# Patient Record
Sex: FEMALE | Race: WHITE
[De-identification: names, ages, dates, MRNs, and addresses within clinical notes are randomized per-mention and may not be internally consistent; named-entity substitution may affect disease eponyms.]

---

## 2020-10-21 ENCOUNTER — HOSPITAL ENCOUNTER (EMERGENCY)
Age: 60
Discharge: HOME | End: 2020-10-21
Payer: MEDICARE

## 2020-10-21 VITALS
SYSTOLIC BLOOD PRESSURE: 184 MMHG | DIASTOLIC BLOOD PRESSURE: 84 MMHG | RESPIRATION RATE: 18 BRPM | TEMPERATURE: 98.06 F | OXYGEN SATURATION: 99 % | HEART RATE: 74 BPM

## 2020-10-21 VITALS — BODY MASS INDEX: 23.8 KG/M2

## 2020-10-21 VITALS
TEMPERATURE: 98.06 F | HEART RATE: 74 BPM | OXYGEN SATURATION: 99 % | RESPIRATION RATE: 18 BRPM | DIASTOLIC BLOOD PRESSURE: 84 MMHG | SYSTOLIC BLOOD PRESSURE: 184 MMHG

## 2020-10-21 DIAGNOSIS — Z20.828: Primary | ICD-10-CM

## 2020-10-21 PROCEDURE — U0003 INFECTIOUS AGENT DETECTION BY NUCLEIC ACID (DNA OR RNA); SEVERE ACUTE RESPIRATORY SYNDROME CORONAVIRUS 2 (SARS-COV-2) (CORONAVIRUS DISEASE [COVID-19]), AMPLIFIED PROBE TECHNIQUE, MAKING USE OF HIGH THROUGHPUT TECHNOLOGIES AS DESCRIBED BY CMS-2020-01-R: HCPCS

## 2020-10-21 PROCEDURE — 99201: CPT

## 2021-05-17 DIAGNOSIS — Z00.6 EXAMINATION FOR NORMAL COMPARISON FOR CLINICAL RESEARCH: Primary | ICD-10-CM

## 2021-05-24 ENCOUNTER — OFFICE VISIT (OUTPATIENT)
Dept: CARDIAC SURGERY | Facility: CLINIC | Age: 61
End: 2021-05-24

## 2021-05-24 VITALS
TEMPERATURE: 98.2 F | WEIGHT: 130 LBS | OXYGEN SATURATION: 98 % | HEART RATE: 76 BPM | BODY MASS INDEX: 23.04 KG/M2 | SYSTOLIC BLOOD PRESSURE: 169 MMHG | DIASTOLIC BLOOD PRESSURE: 82 MMHG | HEIGHT: 63 IN

## 2021-05-24 DIAGNOSIS — I73.9 PERIPHERAL VASCULAR DISEASE (HCC): Primary | ICD-10-CM

## 2021-05-24 PROCEDURE — 99204 OFFICE O/P NEW MOD 45 MIN: CPT | Performed by: THORACIC SURGERY (CARDIOTHORACIC VASCULAR SURGERY)

## 2021-05-24 RX ORDER — GABAPENTIN 300 MG/1
300 CAPSULE ORAL 3 TIMES DAILY
COMMUNITY
Start: 2021-04-29

## 2021-05-24 RX ORDER — ALPRAZOLAM 0.5 MG/1
0.5 TABLET ORAL 2 TIMES DAILY
COMMUNITY
Start: 2021-04-29

## 2021-05-24 RX ORDER — CLOPIDOGREL BISULFATE 75 MG/1
75 TABLET ORAL DAILY
COMMUNITY
Start: 2021-04-12

## 2021-05-24 RX ORDER — TIZANIDINE 4 MG/1
4 TABLET ORAL 2 TIMES DAILY
COMMUNITY
Start: 2021-04-30 | End: 2022-02-28

## 2021-05-24 RX ORDER — RANOLAZINE 500 MG/1
500 TABLET, EXTENDED RELEASE ORAL DAILY
COMMUNITY
Start: 2021-04-12

## 2021-05-24 RX ORDER — FERROUS SULFATE 325(65) MG
325 TABLET ORAL
COMMUNITY

## 2021-05-24 RX ORDER — LISINOPRIL 20 MG/1
20 TABLET ORAL DAILY
COMMUNITY
Start: 2021-05-03

## 2021-05-24 RX ORDER — INSULIN GLARGINE 100 [IU]/ML
15 INJECTION, SOLUTION SUBCUTANEOUS DAILY
COMMUNITY

## 2021-05-24 RX ORDER — METOPROLOL SUCCINATE 50 MG/1
75 TABLET, EXTENDED RELEASE ORAL DAILY
COMMUNITY
Start: 2021-05-20

## 2021-05-24 RX ORDER — ISOSORBIDE MONONITRATE 120 MG/1
120 TABLET, EXTENDED RELEASE ORAL DAILY
COMMUNITY
Start: 2021-04-28

## 2021-05-24 RX ORDER — NITROGLYCERIN 400 UG/1
1 SPRAY ORAL AS NEEDED
COMMUNITY
Start: 2021-04-30

## 2021-05-24 RX ORDER — ASPIRIN 81 MG/1
81 TABLET ORAL DAILY
COMMUNITY

## 2021-05-24 NOTE — PROGRESS NOTES
05/24/2021  Patient Information  Paula Valencia                                                                                          [address]   [place] [address]   1960  'PCP/Referring Physician'  Oswald Cedillo MD  [phone]  No ref. provider found    Chief Complaint   Patient presents with   • Consult     Np referred for peripheral vascular disease,complains of cramps in her calves and toes.   • Peripheral Vascular Disease       History of Present Illness:  The patient is a 61-year-old female who is being referred at this time for evaluation of peripheral vascular disease.  She has been having a lot of cramping in her legs.  Potassium replacement does not appear to improve the symptoms.  She has been a smoker for most of her adult life.  She is down to about 1/2 pack/day of cigarettes.  She recently had a CTA scan which revealed, what appeared to be, severe narrowing of the infrarenal aorta.  She does have a great deal of calcified plaque and perhaps iliac narrowing as well.  She has been referred to me for further evaluation.      Patient Active Problem List   Diagnosis   • Peripheral vascular disease (CMS/HCC)     Past Medical History:   Diagnosis Date   • Anxiety    • Arthritis    • Coronary artery disease    • Diabetes mellitus (CMS/HCC)    • GERD (gastroesophageal reflux disease)    • History of transfusion    • Hyperlipidemia    • Hypertension    • Peripheral vascular disease (CMS/HCC)    • Renal cyst     bilateral     Past Surgical History:   Procedure Laterality Date   • BACK SURGERY     • CHOLECYSTECTOMY     • CORONARY ARTERY BYPASS GRAFT     • CORONARY STENT PLACEMENT     • HYSTERECTOMY     • INGUINAL HERNIA REPAIR Right        Current Outpatient Medications:   •  ALPRAZolam (XANAX) 0.5 MG tablet, Take 0.5 mg by mouth 2 (Two) Times a Day As Needed. for anxiety, Disp: , Rfl:   •  aspirin 81 MG EC tablet, Take 81 mg by mouth Daily., Disp: , Rfl:   •  clopidogrel (PLAVIX) 75 MG tablet,  Take 75 mg by mouth Daily., Disp: , Rfl:   •  ferrous sulfate 325 (65 FE) MG tablet, Take 325 mg by mouth Daily With Breakfast., Disp: , Rfl:   •  gabapentin (NEURONTIN) 300 MG capsule, Take 300 mg by mouth 3 (Three) Times a Day., Disp: , Rfl:   •  insulin glargine (LANTUS, SEMGLEE) 100 UNIT/ML injection, Inject 15 Units under the skin into the appropriate area as directed Daily., Disp: , Rfl:   •  Insulin Lispro (HUMALOG KWIKPEN SC), Inject 10 Units under the skin into the appropriate area as directed As Needed., Disp: , Rfl:   •  isosorbide mononitrate (IMDUR) 120 MG 24 hr tablet, Take 120 mg by mouth Daily., Disp: , Rfl:   •  lisinopril (PRINIVIL,ZESTRIL) 20 MG tablet, Take 20 mg by mouth Daily., Disp: , Rfl:   •  metFORMIN (GLUCOPHAGE) 500 MG tablet, Take 1,000 mg by mouth 2 (Two) Times a Day With Meals., Disp: , Rfl:   •  metoprolol succinate XL (TOPROL-XL) 50 MG 24 hr tablet, Take 25 mg by mouth Daily., Disp: , Rfl:   •  nitroglycerin (NITROLINGUAL) 0.4 MG/SPRAY spray, Place 0.4 mg under the tongue As Needed., Disp: , Rfl:   •  ranolazine (RANEXA) 500 MG 12 hr tablet, Take 500 mg by mouth Daily., Disp: , Rfl:   •  tiZANidine (ZANAFLEX) 4 MG tablet, Take 4 mg by mouth 2 (two) times a day., Disp: , Rfl:   No Known Allergies  Social History     Socioeconomic History   • Marital status:      Spouse name: Not on file   • Number of children: 2   • Years of education: Not on file   • Highest education level: Not on file   Tobacco Use   • Smoking status: Current Some Day Smoker     Packs/day: 0.50     Years: 30.00     Pack years: 15.00     Types: Cigarettes   • Smokeless tobacco: Never Used   Substance and Sexual Activity   • Alcohol use: Not Currently   • Drug use: Never     Family History   Problem Relation Age of Onset   • Heart attack Mother    • Hypertension Mother    • Emphysema Mother    • Other Father         history unknown     Review of Systems   Constitutional: Negative. Negative for chills, fever,  "malaise/fatigue, night sweats and weight loss.   HENT: Negative for hearing loss, odynophagia and sore throat.    Cardiovascular: Positive for chest pain, claudication, dyspnea on exertion and leg swelling. Negative for orthopnea and palpitations.   Respiratory: Negative for cough and hemoptysis.    Endocrine: Negative for cold intolerance, heat intolerance, polydipsia, polyphagia and polyuria.   Hematologic/Lymphatic: Bruises/bleeds easily.   Skin: Positive for poor wound healing. Negative for itching and rash.   Musculoskeletal: Positive for joint pain and muscle cramps. Negative for joint swelling and myalgias.   Gastrointestinal: Positive for diarrhea and dysphagia. Negative for constipation, hematemesis, hematochezia, melena, nausea and vomiting.   Genitourinary: Positive for flank pain. Negative for dysuria, frequency and hematuria.   Neurological: Positive for dizziness and loss of balance. Negative for focal weakness, numbness and seizures.   Psychiatric/Behavioral: Negative for suicidal ideas. The patient is nervous/anxious.    All other systems reviewed and are negative.    Vitals:    05/24/21 1510   BP: 169/82   BP Location: Right arm   Patient Position: Sitting   Pulse: 76   Temp: 98.2 °F (36.8 °C)   SpO2: 98%   Weight: 59 kg (130 lb)   Height: 160 cm (63\")      Physical Exam  Vitals and nursing note reviewed.   Constitutional:       General: She is not in acute distress.     Appearance: She is well-developed.   HENT:      Head: Normocephalic.   Eyes:      Conjunctiva/sclera: Conjunctivae normal.      Pupils: Pupils are equal, round, and reactive to light.   Neck:      Thyroid: No thyroid mass or thyromegaly.   Cardiovascular:      Rate and Rhythm: Normal rate.      Pulses:           Dorsalis pedis pulses are 1+ on the right side and 1+ on the left side.        Posterior tibial pulses are 1+ on the right side and 1+ on the left side.      Heart sounds: No murmur heard.   No friction rub. No gallop.  "   Pulmonary:      Breath sounds: No wheezing, rhonchi or rales.   Abdominal:      General: Bowel sounds are normal. There is no distension.      Palpations: Abdomen is soft. There is no mass.      Tenderness: There is no abdominal tenderness.   Musculoskeletal:         General: No deformity. Normal range of motion.      Cervical back: Normal range of motion.   Skin:     Findings: No petechiae.      Nails: There is no clubbing.   Neurological:      Mental Status: She is oriented to person, place, and time.      Cranial Nerves: No cranial nerve deficit.      Sensory: No sensory deficit.   Psychiatric:         Behavior: Behavior normal.         The ROS, past medical history, surgical history, family history, social history and vitals were reviewed by myself and corrected as needed.      Labs/Imaging:  I have obtained and reviewed the medical records from Dr. Reilly, including the CT scan demonstrating peripheral vascular disease.    Assessment/Plan:   The patient is a 61-year-old  female who is being referred for peripheral vascular disease. She has a long history of smoking.  I spent 3 to 5 minutes talking with her about the importance of smoking cessation and the consequences thereof.  I discussed the disease processes associated with smoking.  She appears to understand all of this.  She apparently is trying to quit.  I also reviewed her CT scan and I concur with the radiologist interpretation that there appears to be severe narrowing and a great deal of plaque associated with the aorta, in the infrarenal portion, particularly.  This may be near obstructive, although it is hard to tell from the CT.  I had a very nicol discussion with both her and her .  I told them that I do not think she is in danger of losing her extremities at this time, but if her aorta occludes, it could be difficult and we might require an AF bypass to actually get blood flow back.  Whereas, if we did a stent we might be able to  prevent this.  I do not know if the aortic stenosis is causing her cramping in her legs and I have, also, been very open about this.  I have given them the option of continued conservative measures and close follow-up in 3 to 4 months versus proceeding with catheterization and possible catheter-based intervention.  They both feel that an aortogram possible catheter-based-based intervention seems to be the logical and the best choice.  I concur with that.  We will plan for this in the near future.    Patient Active Problem List   Diagnosis   • Peripheral vascular disease (CMS/[facility])       CC: MD Sharita Dempsey editing for Beau Vasques MD      I, Beau Vasques MD, have read and agree with the editing done by Sharita Kiser, .

## 2021-05-25 DIAGNOSIS — I73.9 PVD (PERIPHERAL VASCULAR DISEASE) (HCC): Primary | ICD-10-CM

## 2021-05-27 ENCOUNTER — PREP FOR SURGERY (OUTPATIENT)
Dept: OTHER | Facility: HOSPITAL | Age: 61
End: 2021-05-27

## 2021-05-27 DIAGNOSIS — I73.9 PVD (PERIPHERAL VASCULAR DISEASE) (HCC): Primary | ICD-10-CM

## 2021-06-01 ENCOUNTER — PRE-ADMISSION TESTING (OUTPATIENT)
Dept: PREADMISSION TESTING | Facility: HOSPITAL | Age: 61
End: 2021-06-01

## 2021-06-01 ENCOUNTER — APPOINTMENT (OUTPATIENT)
Dept: PREADMISSION TESTING | Facility: HOSPITAL | Age: 61
End: 2021-06-01

## 2021-06-01 VITALS — HEIGHT: 63 IN | BODY MASS INDEX: 22.97 KG/M2 | WEIGHT: 129.63 LBS

## 2021-06-01 DIAGNOSIS — I73.9 PVD (PERIPHERAL VASCULAR DISEASE) (HCC): ICD-10-CM

## 2021-06-01 LAB
ANION GAP SERPL CALCULATED.3IONS-SCNC: 11 MMOL/L (ref 5–15)
APTT PPP: 26.1 SECONDS (ref 22–39)
BUN SERPL-MCNC: 16 MG/DL (ref 8–23)
BUN/CREAT SERPL: 14.7 (ref 7–25)
CALCIUM SPEC-SCNC: 9.6 MG/DL (ref 8.6–10.5)
CHLORIDE SERPL-SCNC: 102 MMOL/L (ref 98–107)
CO2 SERPL-SCNC: 26 MMOL/L (ref 22–29)
CREAT SERPL-MCNC: 1.09 MG/DL (ref 0.57–1)
DEPRECATED RDW RBC AUTO: 44.3 FL (ref 37–54)
ERYTHROCYTE [DISTWIDTH] IN BLOOD BY AUTOMATED COUNT: 12.9 % (ref 12.3–15.4)
GFR SERPL CREATININE-BSD FRML MDRD: 51 ML/MIN/1.73
GLUCOSE SERPL-MCNC: 151 MG/DL (ref 65–99)
HBA1C MFR BLD: 9.1 % (ref 4.8–5.6)
HCT VFR BLD AUTO: 42.9 % (ref 34–46.6)
HGB BLD-MCNC: 13.8 G/DL (ref 12–15.9)
INR PPP: 1 (ref 0.85–1.16)
MCH RBC QN AUTO: 30.1 PG (ref 26.6–33)
MCHC RBC AUTO-ENTMCNC: 32.2 G/DL (ref 31.5–35.7)
MCV RBC AUTO: 93.5 FL (ref 79–97)
PLATELET # BLD AUTO: 243 10*3/MM3 (ref 140–450)
PMV BLD AUTO: 11 FL (ref 6–12)
POTASSIUM SERPL-SCNC: 4.2 MMOL/L (ref 3.5–5.2)
PROTHROMBIN TIME: 12.9 SECONDS (ref 11.4–14.4)
QT INTERVAL: 454 MS
QTC INTERVAL: 460 MS
RBC # BLD AUTO: 4.59 10*6/MM3 (ref 3.77–5.28)
SODIUM SERPL-SCNC: 139 MMOL/L (ref 136–145)
WBC # BLD AUTO: 8.04 10*3/MM3 (ref 3.4–10.8)

## 2021-06-01 PROCEDURE — U0004 COV-19 TEST NON-CDC HGH THRU: HCPCS

## 2021-06-01 PROCEDURE — C9803 HOPD COVID-19 SPEC COLLECT: HCPCS

## 2021-06-01 PROCEDURE — U0005 INFEC AGEN DETEC AMPLI PROBE: HCPCS

## 2021-06-01 PROCEDURE — 85730 THROMBOPLASTIN TIME PARTIAL: CPT

## 2021-06-01 PROCEDURE — 85027 COMPLETE CBC AUTOMATED: CPT

## 2021-06-01 PROCEDURE — 85610 PROTHROMBIN TIME: CPT

## 2021-06-01 PROCEDURE — 93010 ELECTROCARDIOGRAM REPORT: CPT | Performed by: INTERNAL MEDICINE

## 2021-06-01 PROCEDURE — 83036 HEMOGLOBIN GLYCOSYLATED A1C: CPT

## 2021-06-01 PROCEDURE — 80048 BASIC METABOLIC PNL TOTAL CA: CPT

## 2021-06-01 PROCEDURE — 93005 ELECTROCARDIOGRAM TRACING: CPT

## 2021-06-01 PROCEDURE — 36415 COLL VENOUS BLD VENIPUNCTURE: CPT

## 2021-06-01 NOTE — PAT
Pt has not used her c-pap in several months .    Plavix not stopped as pt has cardiac stent x 1     Per Anesthesia Request, patient instructed not to take their ACE/ARB medications on the AM of surgery.

## 2021-06-01 NOTE — DISCHARGE INSTRUCTIONS
Per Anesthesia Request, patient instructed not to take their ACE/ARB medications on the AM of surgery.    The following information and instructions were given:    Do not eat, drink, smoke or chew gum after midnight the night before surgery. This includes no mints.  Take all routine, prescribed medications including heart and blood pressure medicines with a sip of water unless otherwise instructed by your physician.   Do NOT take diabetic medication unless instructed by your physician.    DO NOT shave for two days before your procedure.  Do not wear makeup.      DO NOT wear fingernail polish (gel/regular) and/or acrylic/artificial nails on the day of surgery.   If you had a recent manicure and would rather not remove polish or artificial nails, the minimum requirement is that the polish/artificial nails must be removed from the middle finger on each hand.      If you are having surgery/procedure on an upper extremity, fingernail polish/artificial fingernails must be removed for surgery.  NO EXCEPTIONS.      If you are having surgery/procedure on a lower extremity, toenail polish on both extremities must be removed for surgery.  NO EXCEPTIONS.    Remove all jewelry (advise to go to jeweler if unable to remove).  Jewelry, especially rings, can no longer be taped for surgery.    Leave anything you consider valuable at home.    Leave your suitcase in the car until after your surgery.    Bring the following with you the day of your procedure (when applicable):       -Picture ID and insurance cards       -Co-pay/deductible required by insurance       -Medications in the original bottles (not a list) including all over-the-counter medications if not brought to PAT       -Copy of advance directive, living will or power of  documents if not brought to PAT       -CPAP or BIPAP mask and tubing (do not bring machine)       -Skin prep instruction(s) sheet       -PAT Pass    Education booklet, brochure, handout or binder  related to procedure given to patient.  Education booklet also includes general information related to their recovery that mentions signs and symptoms of infection and when to call the doctor.    Pain Control After Surgery handout given to patient.    Respirex use (handout given to patient) and pneumonia prevention education provided.    Signs and Symptoms of infection discussed with patient in Pre Admission Testing.  Patient instructed to call their doctor if any of the following symptoms are noted during recovery:  Fever of 100.4 F or higher, incision that is warm or has increasing bleeding, redness or drainage.    DVT Prevention instructions given verbally during Pre Admission Testing appointment that stress the importance of ambulation to improve blood circulation.  Also encouraged patient to perform foot exercises when in bed and application of a sequential device may be applied to lower extremities to improve circulation.

## 2021-06-02 LAB — SARS-COV-2 RNA PNL SPEC NAA+PROBE: NOT DETECTED

## 2021-06-14 ENCOUNTER — APPOINTMENT (OUTPATIENT)
Dept: PREADMISSION TESTING | Facility: HOSPITAL | Age: 61
End: 2021-06-14

## 2021-06-14 LAB — SARS-COV-2 RNA PNL SPEC NAA+PROBE: NOT DETECTED

## 2021-06-14 PROCEDURE — C9803 HOPD COVID-19 SPEC COLLECT: HCPCS

## 2021-06-14 PROCEDURE — U0005 INFEC AGEN DETEC AMPLI PROBE: HCPCS

## 2021-06-14 PROCEDURE — U0004 COV-19 TEST NON-CDC HGH THRU: HCPCS

## 2021-06-15 ENCOUNTER — ANESTHESIA EVENT (OUTPATIENT)
Dept: PERIOP | Facility: HOSPITAL | Age: 61
End: 2021-06-15

## 2021-06-15 RX ORDER — FAMOTIDINE 10 MG/ML
20 INJECTION, SOLUTION INTRAVENOUS ONCE
Status: CANCELLED | OUTPATIENT
Start: 2021-06-15 | End: 2021-06-15

## 2021-06-16 ENCOUNTER — APPOINTMENT (OUTPATIENT)
Dept: GENERAL RADIOLOGY | Facility: HOSPITAL | Age: 61
End: 2021-06-16

## 2021-06-16 ENCOUNTER — APPOINTMENT (OUTPATIENT)
Dept: CT IMAGING | Facility: HOSPITAL | Age: 61
End: 2021-06-16

## 2021-06-16 ENCOUNTER — ANESTHESIA (OUTPATIENT)
Dept: PERIOP | Facility: HOSPITAL | Age: 61
End: 2021-06-16

## 2021-06-16 ENCOUNTER — HOSPITAL ENCOUNTER (OUTPATIENT)
Facility: HOSPITAL | Age: 61
Setting detail: SURGERY ADMIT
Discharge: HOME | End: 2021-06-16
Attending: THORACIC SURGERY (CARDIOTHORACIC VASCULAR SURGERY) | Admitting: THORACIC SURGERY (CARDIOTHORACIC VASCULAR SURGERY)

## 2021-06-16 VITALS
BODY MASS INDEX: 22.86 KG/M2 | DIASTOLIC BLOOD PRESSURE: 78 MMHG | OXYGEN SATURATION: 94 % | WEIGHT: 129 LBS | SYSTOLIC BLOOD PRESSURE: 147 MMHG | HEIGHT: 63 IN | RESPIRATION RATE: 18 BRPM | HEART RATE: 78 BPM | TEMPERATURE: 98.5 F

## 2021-06-16 DIAGNOSIS — I73.9 PVD (PERIPHERAL VASCULAR DISEASE) (HCC): ICD-10-CM

## 2021-06-16 LAB
GLUCOSE BLDC GLUCOMTR-MCNC: 190 MG/DL (ref 70–130)
GLUCOSE BLDC GLUCOMTR-MCNC: 214 MG/DL (ref 70–130)

## 2021-06-16 PROCEDURE — 0 IOPAMIDOL PER 1 ML: Performed by: THORACIC SURGERY (CARDIOTHORACIC VASCULAR SURGERY)

## 2021-06-16 PROCEDURE — 75630 X-RAY AORTA LEG ARTERIES: CPT | Performed by: THORACIC SURGERY (CARDIOTHORACIC VASCULAR SURGERY)

## 2021-06-16 PROCEDURE — C1769 GUIDE WIRE: HCPCS | Performed by: THORACIC SURGERY (CARDIOTHORACIC VASCULAR SURGERY)

## 2021-06-16 PROCEDURE — 25010000002 DEXAMETHASONE PER 1 MG: Performed by: NURSE ANESTHETIST, CERTIFIED REGISTERED

## 2021-06-16 PROCEDURE — 25010000002 FENTANYL CITRATE (PF) 50 MCG/ML SOLUTION: Performed by: NURSE ANESTHETIST, CERTIFIED REGISTERED

## 2021-06-16 PROCEDURE — C1894 INTRO/SHEATH, NON-LASER: HCPCS | Performed by: THORACIC SURGERY (CARDIOTHORACIC VASCULAR SURGERY)

## 2021-06-16 PROCEDURE — 25010000002 HYDRALAZINE PER 20 MG: Performed by: NURSE ANESTHETIST, CERTIFIED REGISTERED

## 2021-06-16 PROCEDURE — 25010000002 ONDANSETRON PER 1 MG: Performed by: NURSE ANESTHETIST, CERTIFIED REGISTERED

## 2021-06-16 PROCEDURE — 75635 CT ANGIO ABDOMINAL ARTERIES: CPT

## 2021-06-16 PROCEDURE — 82962 GLUCOSE BLOOD TEST: CPT

## 2021-06-16 PROCEDURE — 36245 INS CATH ABD/L-EXT ART 1ST: CPT | Performed by: THORACIC SURGERY (CARDIOTHORACIC VASCULAR SURGERY)

## 2021-06-16 PROCEDURE — 25010000002 HEPARIN (PORCINE) PER 1000 UNITS: Performed by: THORACIC SURGERY (CARDIOTHORACIC VASCULAR SURGERY)

## 2021-06-16 PROCEDURE — C1887 CATHETER, GUIDING: HCPCS | Performed by: THORACIC SURGERY (CARDIOTHORACIC VASCULAR SURGERY)

## 2021-06-16 PROCEDURE — 75716 ARTERY X-RAYS ARMS/LEGS: CPT

## 2021-06-16 PROCEDURE — 25010000003 LIDOCAINE 1 % SOLUTION: Performed by: THORACIC SURGERY (CARDIOTHORACIC VASCULAR SURGERY)

## 2021-06-16 PROCEDURE — 25010000002 PROPOFOL 10 MG/ML EMULSION: Performed by: NURSE ANESTHETIST, CERTIFIED REGISTERED

## 2021-06-16 PROCEDURE — 25010000002 DROPERIDOL PER 5 MG: Performed by: NURSE ANESTHETIST, CERTIFIED REGISTERED

## 2021-06-16 RX ORDER — IPRATROPIUM BROMIDE AND ALBUTEROL SULFATE 2.5; .5 MG/3ML; MG/3ML
3 SOLUTION RESPIRATORY (INHALATION) ONCE AS NEEDED
Status: DISCONTINUED | OUTPATIENT
Start: 2021-06-16 | End: 2021-06-16 | Stop reason: HOSPADM

## 2021-06-16 RX ORDER — EPHEDRINE SULFATE 50 MG/ML
INJECTION, SOLUTION INTRAVENOUS AS NEEDED
Status: DISCONTINUED | OUTPATIENT
Start: 2021-06-16 | End: 2021-06-16 | Stop reason: SURG

## 2021-06-16 RX ORDER — SODIUM CHLORIDE 0.9 % (FLUSH) 0.9 %
3 SYRINGE (ML) INJECTION EVERY 12 HOURS SCHEDULED
Status: DISCONTINUED | OUTPATIENT
Start: 2021-06-16 | End: 2021-06-16 | Stop reason: HOSPADM

## 2021-06-16 RX ORDER — FENTANYL CITRATE 50 UG/ML
50 INJECTION, SOLUTION INTRAMUSCULAR; INTRAVENOUS
Status: DISCONTINUED | OUTPATIENT
Start: 2021-06-16 | End: 2021-06-16 | Stop reason: HOSPADM

## 2021-06-16 RX ORDER — HYDROCODONE BITARTRATE AND ACETAMINOPHEN 5; 325 MG/1; MG/1
1 TABLET ORAL ONCE AS NEEDED
Status: DISCONTINUED | OUTPATIENT
Start: 2021-06-16 | End: 2021-06-16 | Stop reason: HOSPADM

## 2021-06-16 RX ORDER — ONDANSETRON 2 MG/ML
4 INJECTION INTRAMUSCULAR; INTRAVENOUS ONCE AS NEEDED
Status: COMPLETED | OUTPATIENT
Start: 2021-06-16 | End: 2021-06-16

## 2021-06-16 RX ORDER — DEXAMETHASONE SODIUM PHOSPHATE 4 MG/ML
INJECTION, SOLUTION INTRA-ARTICULAR; INTRALESIONAL; INTRAMUSCULAR; INTRAVENOUS; SOFT TISSUE AS NEEDED
Status: DISCONTINUED | OUTPATIENT
Start: 2021-06-16 | End: 2021-06-16 | Stop reason: SURG

## 2021-06-16 RX ORDER — HYDRALAZINE HYDROCHLORIDE 20 MG/ML
5 INJECTION INTRAMUSCULAR; INTRAVENOUS
Status: DISCONTINUED | OUTPATIENT
Start: 2021-06-16 | End: 2021-06-16 | Stop reason: HOSPADM

## 2021-06-16 RX ORDER — SODIUM CHLORIDE 9 MG/ML
9 INJECTION, SOLUTION INTRAVENOUS CONTINUOUS
Status: DISCONTINUED | OUTPATIENT
Start: 2021-06-16 | End: 2021-06-16 | Stop reason: HOSPADM

## 2021-06-16 RX ORDER — LIDOCAINE HYDROCHLORIDE 10 MG/ML
INJECTION, SOLUTION EPIDURAL; INFILTRATION; INTRACAUDAL; PERINEURAL AS NEEDED
Status: DISCONTINUED | OUTPATIENT
Start: 2021-06-16 | End: 2021-06-16 | Stop reason: SURG

## 2021-06-16 RX ORDER — NALOXONE HCL 0.4 MG/ML
0.4 VIAL (ML) INJECTION AS NEEDED
Status: DISCONTINUED | OUTPATIENT
Start: 2021-06-16 | End: 2021-06-16 | Stop reason: HOSPADM

## 2021-06-16 RX ORDER — LIDOCAINE HYDROCHLORIDE 10 MG/ML
INJECTION, SOLUTION INFILTRATION; PERINEURAL AS NEEDED
Status: DISCONTINUED | OUTPATIENT
Start: 2021-06-16 | End: 2021-06-16 | Stop reason: HOSPADM

## 2021-06-16 RX ORDER — DROPERIDOL 2.5 MG/ML
0.62 INJECTION, SOLUTION INTRAMUSCULAR; INTRAVENOUS ONCE AS NEEDED
Status: DISCONTINUED | OUTPATIENT
Start: 2021-06-16 | End: 2021-06-16 | Stop reason: HOSPADM

## 2021-06-16 RX ORDER — LABETALOL HYDROCHLORIDE 5 MG/ML
5 INJECTION, SOLUTION INTRAVENOUS
Status: DISCONTINUED | OUTPATIENT
Start: 2021-06-16 | End: 2021-06-16 | Stop reason: HOSPADM

## 2021-06-16 RX ORDER — PROMETHAZINE HYDROCHLORIDE 25 MG/1
25 SUPPOSITORY RECTAL ONCE AS NEEDED
Status: DISCONTINUED | OUTPATIENT
Start: 2021-06-16 | End: 2021-06-16 | Stop reason: HOSPADM

## 2021-06-16 RX ORDER — SODIUM CHLORIDE, SODIUM LACTATE, POTASSIUM CHLORIDE, CALCIUM CHLORIDE 600; 310; 30; 20 MG/100ML; MG/100ML; MG/100ML; MG/100ML
9 INJECTION, SOLUTION INTRAVENOUS CONTINUOUS
Status: DISCONTINUED | OUTPATIENT
Start: 2021-06-16 | End: 2021-06-16

## 2021-06-16 RX ORDER — PROMETHAZINE HYDROCHLORIDE 25 MG/1
25 TABLET ORAL ONCE AS NEEDED
Status: DISCONTINUED | OUTPATIENT
Start: 2021-06-16 | End: 2021-06-16 | Stop reason: HOSPADM

## 2021-06-16 RX ORDER — BUPIVACAINE HCL/0.9 % NACL/PF 0.125 %
PLASTIC BAG, INJECTION (ML) EPIDURAL AS NEEDED
Status: DISCONTINUED | OUTPATIENT
Start: 2021-06-16 | End: 2021-06-16 | Stop reason: SURG

## 2021-06-16 RX ORDER — HYDROMORPHONE HYDROCHLORIDE 1 MG/ML
0.5 INJECTION, SOLUTION INTRAMUSCULAR; INTRAVENOUS; SUBCUTANEOUS
Status: DISCONTINUED | OUTPATIENT
Start: 2021-06-16 | End: 2021-06-16 | Stop reason: HOSPADM

## 2021-06-16 RX ORDER — ONDANSETRON 2 MG/ML
INJECTION INTRAMUSCULAR; INTRAVENOUS AS NEEDED
Status: DISCONTINUED | OUTPATIENT
Start: 2021-06-16 | End: 2021-06-16 | Stop reason: SURG

## 2021-06-16 RX ORDER — FENTANYL CITRATE 50 UG/ML
INJECTION, SOLUTION INTRAMUSCULAR; INTRAVENOUS AS NEEDED
Status: DISCONTINUED | OUTPATIENT
Start: 2021-06-16 | End: 2021-06-16 | Stop reason: SURG

## 2021-06-16 RX ORDER — SODIUM CHLORIDE 450 MG/100ML
100 INJECTION, SOLUTION INTRAVENOUS CONTINUOUS
Status: CANCELLED | OUTPATIENT
Start: 2021-06-16

## 2021-06-16 RX ORDER — LIDOCAINE HYDROCHLORIDE 10 MG/ML
0.5 INJECTION, SOLUTION EPIDURAL; INFILTRATION; INTRACAUDAL; PERINEURAL ONCE AS NEEDED
Status: COMPLETED | OUTPATIENT
Start: 2021-06-16 | End: 2021-06-16

## 2021-06-16 RX ORDER — SODIUM CHLORIDE 0.9 % (FLUSH) 0.9 %
3-10 SYRINGE (ML) INJECTION AS NEEDED
Status: DISCONTINUED | OUTPATIENT
Start: 2021-06-16 | End: 2021-06-16 | Stop reason: HOSPADM

## 2021-06-16 RX ORDER — MIDAZOLAM HYDROCHLORIDE 1 MG/ML
1 INJECTION INTRAMUSCULAR; INTRAVENOUS
Status: DISCONTINUED | OUTPATIENT
Start: 2021-06-16 | End: 2021-06-16 | Stop reason: HOSPADM

## 2021-06-16 RX ORDER — ATORVASTATIN CALCIUM 80 MG/1
80 TABLET, FILM COATED ORAL DAILY
COMMUNITY

## 2021-06-16 RX ORDER — PROPOFOL 10 MG/ML
VIAL (ML) INTRAVENOUS AS NEEDED
Status: DISCONTINUED | OUTPATIENT
Start: 2021-06-16 | End: 2021-06-16 | Stop reason: SURG

## 2021-06-16 RX ORDER — DROPERIDOL 2.5 MG/ML
0.62 INJECTION, SOLUTION INTRAMUSCULAR; INTRAVENOUS AS NEEDED
Status: DISCONTINUED | OUTPATIENT
Start: 2021-06-16 | End: 2021-06-16 | Stop reason: HOSPADM

## 2021-06-16 RX ORDER — SODIUM CHLORIDE 0.9 % (FLUSH) 0.9 %
10 SYRINGE (ML) INJECTION EVERY 12 HOURS SCHEDULED
Status: DISCONTINUED | OUTPATIENT
Start: 2021-06-16 | End: 2021-06-16 | Stop reason: HOSPADM

## 2021-06-16 RX ORDER — SODIUM CHLORIDE 0.9 % (FLUSH) 0.9 %
10 SYRINGE (ML) INJECTION AS NEEDED
Status: DISCONTINUED | OUTPATIENT
Start: 2021-06-16 | End: 2021-06-16 | Stop reason: HOSPADM

## 2021-06-16 RX ORDER — FAMOTIDINE 20 MG/1
20 TABLET, FILM COATED ORAL ONCE
Status: COMPLETED | OUTPATIENT
Start: 2021-06-16 | End: 2021-06-16

## 2021-06-16 RX ADMIN — LIDOCAINE HYDROCHLORIDE 0.5 ML: 10 INJECTION, SOLUTION EPIDURAL; INFILTRATION; INTRACAUDAL; PERINEURAL at 10:06

## 2021-06-16 RX ADMIN — FAMOTIDINE 20 MG: 20 TABLET ORAL at 10:06

## 2021-06-16 RX ADMIN — FENTANYL CITRATE 100 MCG: 50 INJECTION, SOLUTION INTRAMUSCULAR; INTRAVENOUS at 11:42

## 2021-06-16 RX ADMIN — Medication 40 MCG: at 11:47

## 2021-06-16 RX ADMIN — DEXAMETHASONE SODIUM PHOSPHATE 4 MG: 4 INJECTION, SOLUTION INTRA-ARTICULAR; INTRALESIONAL; INTRAMUSCULAR; INTRAVENOUS; SOFT TISSUE at 11:48

## 2021-06-16 RX ADMIN — ONDANSETRON 4 MG: 2 INJECTION INTRAMUSCULAR; INTRAVENOUS at 13:14

## 2021-06-16 RX ADMIN — ONDANSETRON 4 MG: 2 INJECTION INTRAMUSCULAR; INTRAVENOUS at 11:48

## 2021-06-16 RX ADMIN — NICARDIPINE HYDROCHLORIDE 5 MG/HR: 0.1 INJECTION, SOLUTION INTRAVENOUS at 15:08

## 2021-06-16 RX ADMIN — Medication 40 MCG: at 11:53

## 2021-06-16 RX ADMIN — PROPOFOL 150 MG: 10 INJECTION, EMULSION INTRAVENOUS at 11:42

## 2021-06-16 RX ADMIN — SODIUM CHLORIDE 9 ML/HR: 9 INJECTION, SOLUTION INTRAVENOUS at 10:07

## 2021-06-16 RX ADMIN — HYDRALAZINE HYDROCHLORIDE 5 MG: 20 INJECTION INTRAMUSCULAR; INTRAVENOUS at 14:04

## 2021-06-16 RX ADMIN — EPHEDRINE SULFATE 5 MG: 50 INJECTION INTRAVENOUS at 12:00

## 2021-06-16 RX ADMIN — IOPAMIDOL 125 ML: 755 INJECTION, SOLUTION INTRAVENOUS at 14:52

## 2021-06-16 RX ADMIN — HYDRALAZINE HYDROCHLORIDE 5 MG: 20 INJECTION INTRAMUSCULAR; INTRAVENOUS at 14:23

## 2021-06-16 RX ADMIN — DROPERIDOL 0.62 MG: 2.5 INJECTION, SOLUTION INTRAMUSCULAR; INTRAVENOUS at 13:04

## 2021-06-16 RX ADMIN — LIDOCAINE HYDROCHLORIDE 70 MG: 10 INJECTION, SOLUTION EPIDURAL; INFILTRATION; INTRACAUDAL; PERINEURAL at 11:42

## 2021-06-16 NOTE — INTERVAL H&P NOTE
"[facility] Pre-op    Full history and physical note from office is up to date.     BP (!) 183/89 (BP Location: Right arm, Patient Position: Lying)   Pulse 61   Temp 97 °F (36.1 °C) (Temporal)   Resp 16   Ht 160 cm (63\")   Wt 58.5 kg (129 lb)   SpO2 98%   BMI 22.85 kg/m²       LAB Results:  Lab Results   Component Value Date    WBC 8.04 06/01/2021    HGB 13.8 06/01/2021    HCT 42.9 06/01/2021    MCV 93.5 06/01/2021     06/01/2021    NEUTROABS 4.70 07/29/2015    GLUCOSE 151 (H) 06/01/2021    BUN 16 06/01/2021    CREATININE 1.09 (H) 06/01/2021    EGFRIFNONA 51 (L) 06/01/2021     06/01/2021    K 4.2 06/01/2021     06/01/2021    CO2 26.0 06/01/2021    CALCIUM 9.6 06/01/2021    ALBUMIN 4.2 07/29/2015    AST 23 07/29/2015    ALT 10 07/29/2015    BILITOT 0.2 (L) 07/29/2015    PTT 26.1 06/01/2021    INR 1.00 06/01/2021       Cancer Staging (if applicable)  Cancer Patient: __ yes _X_no __unknown__N/A; If yes, clinical stage T:__ N:__M:__, stage group or __N/A    Assessment: Peripheral vascular disease    Plan:   1. Aortogram with or without runoffs, possible stent  2. Anesthesia aware of elevated BP. Will continue to monitor closely.      Susi Rachel, APRN 6/16/2021 09:52 EDT   "

## 2021-06-16 NOTE — ANESTHESIA POSTPROCEDURE EVALUATION
Patient: Paula Valencia    Procedure Summary     Date: 06/16/21 Room / Location:  [facility] OR [facility]    Anesthesia Start:  Anesthesia Stop:     Procedure: AORTAGRAM WITH OR WITHOUT RUNOFFS POSSIBLE STENT (N/A ) Diagnosis:       PVD (peripheral vascular disease) (CMS/HCC)      (PVD (peripheral vascular disease) (CMS/HCC) [I73.9])    Surgeons: Beau Vasques MD Provider:     Anesthesia Type: Not recorded ASA Status: Not recorded          Anesthesia Type: No value filed.    Vitals  Vitals Value Taken Time   /61 06/16/21 1021   Temp 97 °F (36.1 °C) 06/16/21 1021   Pulse 60 06/16/21 1021   Resp 16 06/16/21 0952   SpO2 97 % 06/16/21 1021           Post Anesthesia Care and Evaluation    Patient location during evaluation: PACU  Patient participation: complete - patient participated  Level of consciousness: awake and alert and sleepy but conscious  Pain management: adequate  Airway patency: patent  Anesthetic complications: No anesthetic complications  PONV Status: none  Cardiovascular status: hemodynamically stable and acceptable  Respiratory status: nonlabored ventilation, acceptable and nasal cannula  Hydration status: acceptable

## 2021-06-16 NOTE — ANESTHESIA POSTPROCEDURE EVALUATION
Patient: Paula Valencia    Procedure Summary     Date: 06/16/21 [facility]    Anesthesia Start: 1137 Anesthesia Stop:     Procedure: ABDOMINAL AORTAGRAM (N/A ) Diagnosis:       PVD (peripheral vascular disease) (CMS/HCC)      (PVD (peripheral vascular disease) (CMS/HCC) [I73.9])    Surgeons: Beau Vasques MD Provider: Esau Paula MD    Anesthesia Type: general, MAC ASA Status: 3          Anesthesia Type: general, MAC    Vitals  Vitals Value Taken Time   BP     Temp     Pulse 73 06/16/21 1252   Resp     SpO2 91 % 06/16/21 1252   Vitals shown include unvalidated device data.    HR 74 SR  /80  RR 12  SpO2 94% 2L NC  T 97.4F    Post Anesthesia Care and Evaluation    Patient location during evaluation: PACU  Patient participation: complete - patient participated  Level of consciousness: awake and alert  Pain management: adequate  Airway patency: patent  Anesthetic complications: No anesthetic complications  PONV Status: none  Cardiovascular status: hemodynamically stable and acceptable  Respiratory status: nonlabored ventilation, acceptable and nasal cannula  Hydration status: acceptable

## 2021-06-16 NOTE — ANESTHESIA PREPROCEDURE EVALUATION
Anesthesia Evaluation     Patient summary reviewed and Nursing notes reviewed   NPO Solid Status: > 8 hours  NPO Liquid Status: > 8 hours           Airway   Mallampati: II  TM distance: >3 FB  Neck ROM: full  No difficulty expected  Dental    (+) poor dentition        Pulmonary    (+) a smoker Current, sleep apnea on CPAP,   (-) COPD, asthma, shortness of breath, recent URI  Cardiovascular     ECG reviewed  Patient on routine beta blocker    (+) hypertension, CAD, CABG (2010 ), cardiac stents (since CABG ) PVD, hyperlipidemia,   (-) dysrhythmias, angina    ROS comment: ECG NSR  NS ST and TW abn     Neuro/Psych  (+) psychiatric history (xanax),     (-) seizures, CVA  GI/Hepatic/Renal/Endo    (+)  GERD,  renal disease (creat 1.09) CRI, diabetes mellitus (A1c>9) type 2,   (-) no thyroid disorder    Musculoskeletal     Abdominal    Substance History      OB/GYN          Other   arthritis,      ROS/Med Hx Other: Plavix                 Anesthesia Plan    ASA 3     general and MAC   (PFL )  intravenous induction     Anesthetic plan, all risks, benefits, and alternatives have been provided, discussed and informed consent has been obtained with: patient.    Plan discussed with CRNA.

## 2021-06-16 NOTE — OP NOTE
Operative Report  Paula Valencia  [id number]  1960    Preop Diagnosis: Atherosclerotic peripheral vascular disease, bilateral claudication        Postop Diagnosis same        Procedure: Aortogram        Surgeons: Beau Vasques        Assistant: Suraj Mg  Assistant: Suraj Mg PA was responsible for performing the following activities: Retraction, Closing and Placing Dressing and their skilled assistance was necessary for the success of this case.       Operative Findings:         Description: The patient was brought to the operating room placed in a supine position on the fluoroscopy table.  The patient was monitored anesthesia.  The patient's abdomen groins were prepped and draped in usual sterile fashion.  Left groin was stuck using a modified Seldinger technique.  A 4 Moroccan sheath was inserted.  At this time a Magic torque wire attempted to get up the iliac artery but was stopped and what appeared to be the bifurcation.  At this time a retrograde aortogram was then done.  This revealed extensive calcifications throughout the iliac system and what appeared to be a very tight stenosis bilaterally at the takeoff of both common iliac arteries.  At this time we switched over and used a angled Glidewire as well as angled glide catheter.  Despite our best efforts there appeared to be a localized dissection that we could not get across the.  The artery actually appeared to be subtotal.  So at this time we then tried a 0.018 wire and still could not get across despite using a torque or and other techniques.  The sheath and wires were removed pressure was held in the operating room.       Operative findings the patient had patent external iliac arteries bilaterally of the internal iliacs were patent both common iliac arteries were patent but there was extensive calcifications bilaterally.  The aorta was patent but there was approximately greater than 90% or a subtotal stenosis of the takeoff of the left  common iliac artery.  There appeared to be at least a 90% stenosis at the takeoff of the right common iliac artery as well.  The lower aorta was patent as well.  The fluoroscopy time was 9 minutes, radiation dose 62 mGy, contrast 40 cc of Visipaque 320.      EBL: Less than 25 cc      Please note that portions of this note were completed with a voice recognition program. Efforts were made to edit the dictations, but words may be mistranscribed      Beau Vasques MD  06/16/21 12:34 EDT

## 2021-06-16 NOTE — ANESTHESIA PROCEDURE NOTES
Airway  Urgency: elective    Date/Time: 6/16/2021 11:44 AM  Airway not difficult    General Information and Staff    Patient location during procedure: OR  CRNA: Salvador Huang CRNA    Indications and Patient Condition  Indications for airway management: airway protection    Preoxygenated: yes  Mask difficulty assessment: 1 - vent by mask    Final Airway Details  Final airway type: supraglottic airway      Successful airway: I-gel  Size 4    Number of attempts at approach: 1  Assessment: lips, teeth, and gum same as pre-op    Additional Comments  LMA placed without difficulty, ventilation with assist, equal breath sounds and symmetric chest rise and fall

## 2021-06-18 NOTE — DISCHARGE SUMMARY
CTS Discharge Summary    Patient Care Team:  Oswald Cedillo MD as PCP - General  Oswald Cedillo MD as PCP - Family Medicine  Amirah Reilly MD as Consulting Physician (Internal Medicine)  Consults:   Consults     No orders found from 5/18/2021 to 6/17/2021.          Date of Admission: 6/16/2021  9:36 AM  Date of Discharge:  6/16/2021    Discharge Diagnosis   Atherosclerotic peripheral vascular disease, bilateral claudication  Status post Aortogram    Past Medical History:   Diagnosis Date   • Anxiety    • Arthritis    • Coronary artery disease    • Diabetes mellitus (CMS/Formerly [facility])    • GERD (gastroesophageal reflux disease)    • History of transfusion    • Hyperlipidemia    • Hypertension    • Peripheral vascular disease (CMS/[facility]ly [facility])    • Renal cyst     bilateral   • Sleep apnea     c-pap not in use since 02/2021     PVD (peripheral vascular disease) (CMS/[facility]ly [facility]) [I73.9]     Procedures Performed  Procedure(s):  ABDOMINAL AORTAGRAM         History of Present Illness  Patient is a 61 y.o. female who is being referred at this time for evaluation of peripheral vascular disease.  She has been having a lot of cramping in her legs.  Potassium replacement does not appear to improve the symptoms.  She has been a smoker for most of her adult life.  She is down to about 1/2 pack/day of cigarettes.  She recently had a CTA scan which revealed, what appeared to be, severe narrowing of the infrarenal aorta.  She does have a great deal of calcified plaque and perhaps iliac narrowing as well.  She has been referred to me for further evaluation      Hospital Course  Patient was admitted on 6/16/2020 and underwent the above-mentioned surgical procedure.  Follow procedure patient transferred here recovery room.  Once PACU criteria was met and surgical site without significant hematoma the patient was discharged home.  Follow-up ointments made.     Operative findings the patient had patent external iliac arteries bilaterally of the  internal iliacs were patent both common iliac arteries were patent but there was extensive calcifications bilaterally.  The aorta was patent but there was approximately greater than 90% or a subtotal stenosis of the takeoff of the left common iliac artery.  There appeared to be at least a 90% stenosis at the takeoff of the right common iliac artery as well.  The lower aorta was patent as well.  The fluoroscopy time was 9 minutes, radiation dose 62 mGy, contrast 40 cc of Visipaque 320.       Discharge Medications     Discharge Medications      Continue These Medications      Instructions Start Date   ALPRAZolam 0.5 MG tablet  Commonly known as: XANAX   0.5 mg, Oral, 2 Times Daily, for anxiety      aspirin 81 MG EC tablet   81 mg, Oral, Daily      atorvastatin 20 MG tablet  Commonly known as: LIPITOR   20 mg, Oral, Daily      clopidogrel 75 MG tablet  Commonly known as: PLAVIX   75 mg, Oral, Daily, Coronary stent x 1      ferrous sulfate 325 (65 FE) MG tablet   325 mg, Oral, Daily With Breakfast      gabapentin 300 MG capsule  Commonly known as: NEURONTIN   300 mg, Oral, 3 Times Daily      HUMALOG KWIKPEN SC   10 Units, Subcutaneous, As Needed      insulin glargine 100 UNIT/ML injection  Commonly known as: LANTUS, SEMGLEE   15 Units, Subcutaneous, Daily, For blood glucose greater than or equal to 400      isosorbide mononitrate 120 MG 24 hr tablet  Commonly known as: IMDUR   120 mg, Oral, Daily      lisinopril 20 MG tablet  Commonly known as: PRINIVIL,ZESTRIL   20 mg, Oral, Daily      metFORMIN 500 MG tablet  Commonly known as: GLUCOPHAGE   1,000 mg, Oral, 2 Times Daily With Meals      metoprolol succinate XL 50 MG 24 hr tablet  Commonly known as: TOPROL-XL   75 mg, Oral, Daily      nitroglycerin 0.4 MG/SPRAY spray  Commonly known as: NITROLINGUAL   1 spray, Sublingual, As Needed      ranolazine 500 MG 12 hr tablet  Commonly known as: RANEXA   500 mg, Oral, Daily      tiZANidine 4 MG tablet  Commonly known as:  ZANAFLEX   4 mg, Oral, 2 times daily             Discharge Diet:   Diet Instructions     Diet: Regular, Consistent Carbohydrate, Cardiac      Discharge Diet:  Regular  Consistent Carbohydrate  Cardiac             Activity at Discharge:   Activity Instructions     Bathing Restrictions      Type of Restriction: Bathing    Bathing Restrictions: No Tub Bath    Driving Restrictions      No driving until released and follow-up appointment    Type of Restriction: Driving    Driving Restrictions: No Driving While Taking Narcotics    Lifting Restrictions      Type of Restriction: Lifting    Lifting Restrictions: Lifting Restriction (Indicate Limit)    Weight Limit (Pounds): 10    Length of Lifting Restriction: To be addressed in follow-up appointment          Follow-up Appointments  Future Appointments   Date Time Provider Department Center   7/19/2021  3:00 PM Ruby Lantigua APRN [facility]        WOUND CARE: Monitor surgical wounds daily.  Keep clean and dry.  Change surgical bandage as needed to keep clean and dry otherwise surgical bandage not needed.  Call cardiovascular and thoracic surgeon's office with any question or concerns regarding the incisions.  Phone number is [phone] specifically let them know if changes such as increased redness, increased pain, increased swelling, increased drainage or opening up of incision occurs.        BRENT Torres  06/18/21  15:38 EDT

## 2021-07-19 ENCOUNTER — OFFICE VISIT (OUTPATIENT)
Dept: CARDIAC SURGERY | Facility: CLINIC | Age: 61
End: 2021-07-19

## 2021-07-19 VITALS
HEART RATE: 73 BPM | HEIGHT: 63 IN | DIASTOLIC BLOOD PRESSURE: 70 MMHG | SYSTOLIC BLOOD PRESSURE: 138 MMHG | BODY MASS INDEX: 22.5 KG/M2 | TEMPERATURE: 97.3 F | OXYGEN SATURATION: 99 % | WEIGHT: 127 LBS

## 2021-07-19 DIAGNOSIS — I73.9 PVD (PERIPHERAL VASCULAR DISEASE) (HCC): Primary | ICD-10-CM

## 2021-07-19 PROBLEM — F41.9 ANXIETY DISORDER: Status: ACTIVE | Noted: 2021-07-19

## 2021-07-19 PROBLEM — K21.9 GERD WITHOUT ESOPHAGITIS: Status: ACTIVE | Noted: 2021-07-19

## 2021-07-19 PROBLEM — E11.9 DIABETES MELLITUS TYPE 2, INSULIN DEPENDENT: Status: ACTIVE | Noted: 2021-07-19

## 2021-07-19 PROBLEM — M19.90 ARTHRITIS: Status: ACTIVE | Noted: 2021-07-19

## 2021-07-19 PROBLEM — Z79.4 DIABETES MELLITUS TYPE 2, INSULIN DEPENDENT: Status: ACTIVE | Noted: 2021-07-19

## 2021-07-19 PROBLEM — E78.5 HLD (HYPERLIPIDEMIA): Status: ACTIVE | Noted: 2021-07-19

## 2021-07-19 PROBLEM — I25.10 CAD (CORONARY ARTERY DISEASE): Status: ACTIVE | Noted: 2021-07-19

## 2021-07-19 PROBLEM — I10 HTN (HYPERTENSION): Status: ACTIVE | Noted: 2021-07-19

## 2021-07-19 PROCEDURE — 99214 OFFICE O/P EST MOD 30 MIN: CPT | Performed by: NURSE PRACTITIONER

## 2021-07-19 NOTE — PROGRESS NOTES
"     [facility] Cardiothoracic Surgery Office Follow Up Note     Date of Encounter: 2021     Name: Paula Valencia  : 1960     Referred By: No ref. provider found  PCP: Oswald Cedillo MD    Chief Complaint:    Chief Complaint   Patient presents with   • Follow-up     Hosp D/C Aortogram 21 for PVD       Subjective      History of Present Illness:   Paula Valencia is a 61 y.o. female current smoker with history of HTN, HLD on statin therapy, insulin-dependent DM (HA1C 9.1), CAD s/p remote CABG in , and PVD s/p CT AA with runoff on 2021 by Dr. Vasques.  She presents today in clinic to discuss results of her most recent test.  She is experiencing bilateral lower extremity intermittent claudication symptoms, no one side worse than the other.  She reports she has difficulty walking from her car to our elevators without experiencing pain.  The pain is in her BLE upper thighs.  She experiences rest pain in her BLE posterior calves while sleeping that she describes as \"mejia horses.\"  She has no previous foot wounds/ulcers or delayed wound healing.    Review of Systems:  Review of Systems   Constitutional: Positive for weight loss (3 lbs since last visit). Negative for chills, decreased appetite, diaphoresis, fever, malaise/fatigue, night sweats and weight gain.   HENT: Negative for hoarse voice.    Eyes: Negative for blurred vision, double vision and visual disturbance.   Cardiovascular: Positive for claudication and dyspnea on exertion. Negative for chest pain, irregular heartbeat, leg swelling, near-syncope, orthopnea, palpitations, paroxysmal nocturnal dyspnea and syncope.   Respiratory: Positive for cough. Negative for hemoptysis, shortness of breath, sputum production and wheezing.    Hematologic/Lymphatic: Negative for adenopathy and bleeding problem. Bruises/bleeds easily.   Skin: Negative for color change, nail changes, poor wound healing and rash.   Musculoskeletal: " Positive for arthritis and joint pain. Negative for back pain, falls and muscle cramps.   Gastrointestinal: Positive for abdominal pain and dysphagia. Negative for heartburn.   Genitourinary: Negative for flank pain.   Neurological: Positive for dizziness and light-headedness. Negative for brief paralysis, disturbances in coordination, focal weakness, headaches, loss of balance, numbness, paresthesias, sensory change, vertigo and weakness.   Psychiatric/Behavioral: Negative for depression and suicidal ideas. The patient is nervous/anxious.    Allergic/Immunologic: Negative for persistent infections.       I have reviewed the following portions of the patient's history: allergies, current medications, past family history, past medical history, past social history, past surgical history and problem list and confirm it's accurate.    Allergies:  No Known Allergies    Medications:      Current Outpatient Medications:   •  ALPRAZolam (XANAX) 0.5 MG tablet, Take 0.5 mg by mouth 2 (Two) Times a Day. for anxiety, Disp: , Rfl:   •  aspirin 81 MG EC tablet, Take 81 mg by mouth Daily., Disp: , Rfl:   •  atorvastatin (LIPITOR) 80 MG tablet, Take 80 mg by mouth Daily., Disp: , Rfl:   •  clopidogrel (PLAVIX) 75 MG tablet, Take 75 mg by mouth Daily. Coronary stent x 1, Disp: , Rfl:   •  ferrous sulfate 325 (65 FE) MG tablet, Take 325 mg by mouth Daily With Breakfast., Disp: , Rfl:   •  gabapentin (NEURONTIN) 300 MG capsule, Take 300 mg by mouth 3 (Three) Times a Day., Disp: , Rfl:   •  insulin glargine (LANTUS, SEMGLEE) 100 UNIT/ML injection, Inject 15 Units under the skin into the appropriate area as directed Daily. For blood glucose greater than or equal to 400, Disp: , Rfl:   •  isosorbide mononitrate (IMDUR) 120 MG 24 hr tablet, Take 120 mg by mouth Daily., Disp: , Rfl:   •  lisinopril (PRINIVIL,ZESTRIL) 20 MG tablet, Take 20 mg by mouth Daily., Disp: , Rfl:   •  metFORMIN (GLUCOPHAGE) 500 MG tablet, Take 1,000 mg by mouth 2  (Two) Times a Day With Meals., Disp: , Rfl:   •  metoprolol succinate XL (TOPROL-XL) 50 MG 24 hr tablet, Take 75 mg by mouth Daily., Disp: , Rfl:   •  nitroglycerin (NITROLINGUAL) 0.4 MG/SPRAY spray, Place 1 spray under the tongue As Needed., Disp: , Rfl:   •  ranolazine (RANEXA) 500 MG 12 hr tablet, Take 500 mg by mouth Daily., Disp: , Rfl:   •  tiZANidine (ZANAFLEX) 4 MG tablet, Take 4 mg by mouth 2 (two) times a day., Disp: , Rfl:   •  insulin NPH-insulin regular (humuLIN 70/30,novoLIN 70/30) (70-30) 100 UNIT/ML injection, Inject  under the skin into the appropriate area as directed As Needed., Disp: , Rfl:     History:   Past Medical History:   Diagnosis Date   • Anxiety    • Arthritis    • Coronary artery disease    • Diabetes mellitus (CMS/HCC)    • GERD (gastroesophageal reflux disease)    • History of transfusion    • Hyperlipidemia    • Hypertension    • Peripheral vascular disease (CMS/HCC)    • Renal cyst     bilateral   • Sleep apnea     c-pap not in use since 02/2021       Past Surgical History:   Procedure Laterality Date   • AORTAGRAM N/A 6/16/2021    Procedure: ABDOMINAL AORTAGRAM;  Surgeon: Beau Vasques MD;  [facility]  [facility];  Service: Vascular;  Laterality: N/A;  FLUORO - 9 MIN   DOISE- 62MGY  40 ML VISIPAQUE 320     • BACK SURGERY     • CARDIAC CATHETERIZATION     • CARDIAC CATHETERIZATION Left 8/5/2021    Procedure: Left Heart Cath;  Surgeon: Beau Frazier MD;  Location:  [facility] CATH INVASIVE [facility];  Service: Cardiovascular;  Laterality: Left;   • CHOLECYSTECTOMY     • COLONOSCOPY  2019   • CORONARY ARTERY BYPASS GRAFT      2013 approx at [facility] x 3    • CORONARY STENT PLACEMENT      after CABG    • ENDOSCOPY     • HYSTERECTOMY     • INGUINAL HERNIA REPAIR Right        Social History     Socioeconomic History   • Marital status:      Spouse name: Not on file   • Number of children: 2   • Years of education: Not on file   • Highest education level: Not on file  "  Tobacco Use   • Smoking status: Current Some Day Smoker     Packs/day: 0.50     Years: 40.00     Pack years: 20.00     Types: Cigarettes     Start date: 1981   • Smokeless tobacco: Never Used   Substance and Sexual Activity   • Alcohol use: Not Currently   • Drug use: Never   • Sexual activity: Defer        Family History   Problem Relation Age of Onset   • Heart attack Mother    • Hypertension Mother    • Emphysema Mother    • Other Father         history unknown   • No Known Problems Sister    • No Known Problems Brother    • No Known Problems Maternal Grandmother    • No Known Problems Maternal Grandfather    • No Known Problems Paternal Grandmother    • No Known Problems Paternal Grandfather        Objective     Physical Exam:  Vitals:    07/19/21 1056   BP: 138/70   BP Location: Left arm   Patient Position: Sitting   Pulse: 73   Temp: 97.3 °F (36.3 °C)   SpO2: 99%   Weight: 57.6 kg (127 lb)   Height: 160 cm (63\")      Body mass index is 22.5 kg/m².    Physical Exam  Vitals and nursing note reviewed.   Constitutional:       Appearance: Normal appearance. She is well-developed.   HENT:      Head: Normocephalic and atraumatic.   Eyes:      Pupils: Pupils are equal, round, and reactive to light.   Neck:      Vascular: No carotid bruit.   Cardiovascular:      Rate and Rhythm: Normal rate and regular rhythm.      Pulses: Normal pulses.      Heart sounds: Normal heart sounds, S1 normal and S2 normal. No murmur heard.     Pulmonary:      Effort: Pulmonary effort is normal.      Breath sounds: Normal breath sounds.   Abdominal:      Palpations: Abdomen is soft.   Musculoskeletal:         General: No swelling.      Cervical back: Neck supple.      Right lower leg: No edema.      Left lower leg: No edema.   Skin:     General: Skin is warm and dry.      Capillary Refill: Capillary refill takes less than 2 seconds.      Findings: No bruising.   Neurological:      General: No focal deficit present.      Mental Status: She " is alert and oriented to person, place, and time. Mental status is at baseline.      GCS: GCS eye subscore is 4. GCS verbal subscore is 5. GCS motor subscore is 6.      Motor: Motor function is intact.      Coordination: Coordination is intact.      Gait: Gait is intact.   Psychiatric:         Mood and Affect: Mood normal.         Speech: Speech normal.         Behavior: Behavior normal. Behavior is cooperative.         Cognition and Memory: Cognition normal.         Imaging/Labs:  Aortogram 6/16/2021 (Dr. Vasques):  Operative findings the patient had patent external iliac arteries bilaterally of the internal iliacs were patent both common iliac arteries were patent but there was extensive calcifications bilaterally.  The aorta was patent but there was approximately greater than 90% or a subtotal stenosis of the takeoff of the left common iliac artery.  There appeared to be at least a 90% stenosis at the takeoff of the right common iliac artery as well.  The lower aorta was patent as well.      CTA abdominal aorta bilateral iliofemoral runoff 6/16/2021:  Vascular: Atherosclerotic but patent distal thoracic aorta.  Atherosclerotic nonaneurysmal abdominal aorta. Patent celiac origin.  Patent superior mesenteric artery, bilateral renal artery and inferior  mesenteric artery origins. There is focal moderate to severe narrowing  of the infrarenal abdominal aorta proximal to the bifurcation with some  prominent calcific plaque. Patent bilateral common iliac arteries,  moderately atherosclerotic. Atherosclerotic multifocal moderate to  severe narrowing of bilateral internal and external iliac arteries. On  the right, patent common, superficial and deep femoral arteries. The  popliteal artery and proximal trifurcation vessels are patent. There is  poor contrast opacification of the trifurcation vessels more distally,  favored to relate to technical factors given its symmetry with the left.  The left common, superficial and  "deep femoral arteries are patent. The  popliteal artery is patent. The trifurcation vessels are poorly  opacified, again possibly due in part to technical factors.     IMPRESSION:  There is poor opacification of the trifurcation vessels  noted bilaterally with essentially 0 vessel runoff to either ankle. This  may represent a combination of poor perfusion due to more proximal  multifocal atherosclerotic disease and technical factors given its  symmetrical appearance. Consider correlation with sonographic duplex  findings.     Multifocal moderate to severe atherosclerotic narrowing of the abdominal  aorta with some focal severe atherosclerotic narrowing proximal to the  iliac bifurcation. The common, external iliac and femoral arteries  demonstrate some moderate multifocal atherosclerotic narrowing,  otherwise patent. The popliteal arteries are patent bilaterally.     No acute nonvascular findings.      Assessment / Plan      Assessment / Plan:  Diagnoses and all orders for this visit:    1. PVD (peripheral vascular disease) (CMS/HCC) (Primary)    Paula Valencia is a 61 y.o. female current smoker with history of HTN, HLD on statin therapy, insulin-dependent DM (HA1C 9.1), CAD s/p remote CABG in 2013, and PVD s/p CT AA with runoff on 6/16/2021 by Dr. Vasques.  She presents today in clinic to discuss results of her most recent test.  She is experiencing bilateral lower extremity intermittent claudication symptoms, no one side worse than the other.  She reports she has difficulty walking from her car to our elevators without experiencing pain.  The pain is in her BLE upper thighs.  She experiences rest pain in her BLE posterior calves while sleeping that she describes as \"mejia horses.\"  She has no previous foot wounds/ulcers or delayed wound healing.  Per her aortogram with Dr. Vasques she has significant calcifications to bilateral common iliac arteries, 90% or subtotal stenosis bilaterally.  Bypass scenario " discussed with patient and family member today in clinic. Risks of surgery were discussed with the patient including: bleeding, infection, blood clots, loss of limb, kidney damage, stroke, heart attack, or death.  Patient understands risks and agrees to proceed.  Patient informed that she will be able to see Dr. Vasques in preop to address any further questions or concerns.  Case will be discussed with Dr. Vsaques prior to scheduling.    Patient Education: smoking cessation    Addendum: Case discussed with Dr Vasques. Patient to be scheduled for AF bypass with Dr Jara assist after obtaining cardiac clearance with Dr Frazier.   Addendum: During cardiac clearance with Dr Frazier pt required multiple drug eluding stents. After Dr Vasques discussed with Dr Grande, they are recommending postponing upcoming surgery in light of inability to hold plavix until 6 months out from cardiac stenting. We will plan to see her back in 5 months before moving forward with bypass.    Follow Up:   Return in about 5 months (around 12/19/2021).   Or sooner for any further concerns or worsening sign and symptoms. If unable to reach us in the office please dial 911 or go to the nearest emergency department.      Ruby Lantigua APRBRAYDEN  [facility] Cardiothoracic Surgery    Time Spent: I spent 38 minutes caring for Paula on this date of service. This time includes time spent by me in the following activities: preparing for the visit, reviewing tests, obtaining and/or reviewing a separately obtained history, performing a medically appropriate examination and/or evaluation, counseling and educating the patient/family/caregiver, ordering medications, tests, or procedures, referring and communicating with other health care professionals, documenting information in the medical record, independently interpreting results and communicating that information with the patient/family/caregiver and care coordination.

## 2021-07-27 ENCOUNTER — TELEPHONE (OUTPATIENT)
Dept: CARDIOLOGY | Facility: CLINIC | Age: 61
End: 2021-07-27

## 2021-07-27 DIAGNOSIS — Z01.810 PREOPERATIVE CARDIOVASCULAR EXAMINATION: Primary | ICD-10-CM

## 2021-07-27 NOTE — TELEPHONE ENCOUNTER
----- Message from Beau Frazier MD sent at 7/26/2021  4:32 PM EDT -----  This patient is scheduled for aortobifem with Dr. Vasques.  He has called me and asked me to do a preop cardiac catheterization on her.  We need to call the patient, and schedule this as soon as possible for a see and treat (left heart catheterization with grafts) same day.  Thank you

## 2021-07-29 PROBLEM — Z01.810 PREOPERATIVE CARDIOVASCULAR EXAMINATION: Status: ACTIVE | Noted: 2021-07-29

## 2021-08-05 ENCOUNTER — HOSPITAL ENCOUNTER (OUTPATIENT)
Facility: HOSPITAL | Age: 61
Discharge: HOME | End: 2021-08-06
Attending: INTERNAL MEDICINE | Admitting: INTERNAL MEDICINE

## 2021-08-05 DIAGNOSIS — Z01.810 PREOPERATIVE CARDIOVASCULAR EXAMINATION: ICD-10-CM

## 2021-08-05 LAB
ALBUMIN SERPL-MCNC: 4.1 G/DL (ref 3.5–5.2)
ALBUMIN/GLOB SERPL: 1.6 G/DL
ALP SERPL-CCNC: 57 U/L (ref 39–117)
ALT SERPL W P-5'-P-CCNC: 7 U/L (ref 1–33)
ANION GAP SERPL CALCULATED.3IONS-SCNC: 13 MMOL/L (ref 5–15)
AST SERPL-CCNC: 15 U/L (ref 1–32)
BILIRUB SERPL-MCNC: 0.5 MG/DL (ref 0–1.2)
BUN SERPL-MCNC: 12 MG/DL (ref 8–23)
BUN/CREAT SERPL: 12.6 (ref 7–25)
CALCIUM SPEC-SCNC: 9.8 MG/DL (ref 8.6–10.5)
CATH EF ESTIMATED: 50 %
CHLORIDE SERPL-SCNC: 103 MMOL/L (ref 98–107)
CHOLEST SERPL-MCNC: 164 MG/DL (ref 0–200)
CO2 SERPL-SCNC: 26 MMOL/L (ref 22–29)
CREAT SERPL-MCNC: 0.95 MG/DL (ref 0.57–1)
DEPRECATED RDW RBC AUTO: 45 FL (ref 37–54)
ERYTHROCYTE [DISTWIDTH] IN BLOOD BY AUTOMATED COUNT: 12.7 % (ref 12.3–15.4)
GFR SERPL CREATININE-BSD FRML MDRD: 60 ML/MIN/1.73
GLOBULIN UR ELPH-MCNC: 2.6 GM/DL
GLUCOSE BLDC GLUCOMTR-MCNC: 251 MG/DL (ref 70–130)
GLUCOSE BLDC GLUCOMTR-MCNC: 297 MG/DL (ref 70–130)
GLUCOSE SERPL-MCNC: 228 MG/DL (ref 65–99)
HBA1C MFR BLD: 8.8 % (ref 4.8–5.6)
HCT VFR BLD AUTO: 42.9 % (ref 34–46.6)
HDLC SERPL-MCNC: 32 MG/DL (ref 40–60)
HGB BLD-MCNC: 13.9 G/DL (ref 12–15.9)
LDLC SERPL CALC-MCNC: 90 MG/DL (ref 0–100)
LDLC/HDLC SERPL: 2.58 {RATIO}
MCH RBC QN AUTO: 31.2 PG (ref 26.6–33)
MCHC RBC AUTO-ENTMCNC: 32.4 G/DL (ref 31.5–35.7)
MCV RBC AUTO: 96.4 FL (ref 79–97)
PLATELET # BLD AUTO: 213 10*3/MM3 (ref 140–450)
PMV BLD AUTO: 10.7 FL (ref 6–12)
POTASSIUM SERPL-SCNC: 4.7 MMOL/L (ref 3.5–5.2)
PROT SERPL-MCNC: 6.7 G/DL (ref 6–8.5)
RBC # BLD AUTO: 4.45 10*6/MM3 (ref 3.77–5.28)
SODIUM SERPL-SCNC: 142 MMOL/L (ref 136–145)
TRIGL SERPL-MCNC: 248 MG/DL (ref 0–150)
VLDLC SERPL-MCNC: 42 MG/DL (ref 5–40)
WBC # BLD AUTO: 9.73 10*3/MM3 (ref 3.4–10.8)

## 2021-08-05 PROCEDURE — C1887 CATHETER, GUIDING: HCPCS | Performed by: INTERNAL MEDICINE

## 2021-08-05 PROCEDURE — A9270 NON-COVERED ITEM OR SERVICE: HCPCS | Performed by: INTERNAL MEDICINE

## 2021-08-05 PROCEDURE — S0260 H&P FOR SURGERY: HCPCS | Performed by: INTERNAL MEDICINE

## 2021-08-05 PROCEDURE — C1874 STENT, COATED/COV W/DEL SYS: HCPCS | Performed by: INTERNAL MEDICINE

## 2021-08-05 PROCEDURE — C9604 PERC D-E COR REVASC T CABG S: HCPCS | Performed by: INTERNAL MEDICINE

## 2021-08-05 PROCEDURE — 63710000001 ALPRAZOLAM 0.5 MG TABLET: Performed by: INTERNAL MEDICINE

## 2021-08-05 PROCEDURE — 0 IOPAMIDOL PER 1 ML: Performed by: INTERNAL MEDICINE

## 2021-08-05 PROCEDURE — 85027 COMPLETE CBC AUTOMATED: CPT | Performed by: NURSE PRACTITIONER

## 2021-08-05 PROCEDURE — 25010000002 BIVALIRUDIN TRIFLUOROACETATE 250 MG RECONSTITUTED SOLUTION 1 EACH VIAL: Performed by: INTERNAL MEDICINE

## 2021-08-05 PROCEDURE — 80053 COMPREHEN METABOLIC PANEL: CPT | Performed by: NURSE PRACTITIONER

## 2021-08-05 PROCEDURE — 93459 L HRT ART/GRFT ANGIO: CPT | Performed by: INTERNAL MEDICINE

## 2021-08-05 PROCEDURE — 63710000001 GABAPENTIN 300 MG CAPSULE: Performed by: INTERNAL MEDICINE

## 2021-08-05 PROCEDURE — C1725 CATH, TRANSLUMIN NON-LASER: HCPCS | Performed by: INTERNAL MEDICINE

## 2021-08-05 PROCEDURE — 25010000002 MIDAZOLAM PER 1 MG: Performed by: INTERNAL MEDICINE

## 2021-08-05 PROCEDURE — 25010000002 ONDANSETRON PER 1 MG: Performed by: INTERNAL MEDICINE

## 2021-08-05 PROCEDURE — G0378 HOSPITAL OBSERVATION PER HR: HCPCS

## 2021-08-05 PROCEDURE — A9270 NON-COVERED ITEM OR SERVICE: HCPCS | Performed by: NURSE PRACTITIONER

## 2021-08-05 PROCEDURE — C1769 GUIDE WIRE: HCPCS | Performed by: INTERNAL MEDICINE

## 2021-08-05 PROCEDURE — 82962 GLUCOSE BLOOD TEST: CPT

## 2021-08-05 PROCEDURE — 63710000001 HYDROCODONE-ACETAMINOPHEN 5-325 MG TABLET: Performed by: INTERNAL MEDICINE

## 2021-08-05 PROCEDURE — C1894 INTRO/SHEATH, NON-LASER: HCPCS | Performed by: INTERNAL MEDICINE

## 2021-08-05 PROCEDURE — 83036 HEMOGLOBIN GLYCOSYLATED A1C: CPT | Performed by: NURSE PRACTITIONER

## 2021-08-05 PROCEDURE — 80061 LIPID PANEL: CPT | Performed by: NURSE PRACTITIONER

## 2021-08-05 PROCEDURE — 25010000002 HEPARIN (PORCINE) PER 1000 UNITS: Performed by: INTERNAL MEDICINE

## 2021-08-05 PROCEDURE — 93571 IV DOP VEL&/PRESS C FLO 1ST: CPT | Performed by: INTERNAL MEDICINE

## 2021-08-05 PROCEDURE — 63710000001 ATORVASTATIN 20 MG TABLET: Performed by: INTERNAL MEDICINE

## 2021-08-05 PROCEDURE — 92937 PRQ TRLUML REVSC CAB GRF 1: CPT | Performed by: INTERNAL MEDICINE

## 2021-08-05 PROCEDURE — 25010000002 FENTANYL CITRATE (PF) 50 MCG/ML SOLUTION: Performed by: INTERNAL MEDICINE

## 2021-08-05 PROCEDURE — 63710000001 ACETAMINOPHEN 325 MG TABLET: Performed by: NURSE PRACTITIONER

## 2021-08-05 DEVICE — STNT CORNRY RESOLUTE ONYX RX 2X12MM: Type: IMPLANTABLE DEVICE | Status: FUNCTIONAL

## 2021-08-05 DEVICE — STNT CORNRY RESOLUTE ONYX RX 2X26MM: Type: IMPLANTABLE DEVICE | Status: FUNCTIONAL

## 2021-08-05 DEVICE — XIENCE SIERRA™ EVEROLIMUS ELUTING CORONARY STENT SYSTEM 3.50 MM X 23 MM / RAPID-EXCHANGE
Type: IMPLANTABLE DEVICE | Status: FUNCTIONAL
Brand: XIENCE SIERRA™

## 2021-08-05 DEVICE — XIENCE SIERRA™ EVEROLIMUS ELUTING CORONARY STENT SYSTEM 3.00 MM X 28 MM / RAPID-EXCHANGE
Type: IMPLANTABLE DEVICE | Status: FUNCTIONAL
Brand: XIENCE SIERRA™

## 2021-08-05 RX ORDER — ISOSORBIDE MONONITRATE 120 MG/1
120 TABLET, EXTENDED RELEASE ORAL DAILY
Status: DISCONTINUED | OUTPATIENT
Start: 2021-08-06 | End: 2021-08-06 | Stop reason: HOSPADM

## 2021-08-05 RX ORDER — ASPIRIN 325 MG
325 TABLET ORAL ONCE
Status: DISCONTINUED | OUTPATIENT
Start: 2021-08-05 | End: 2021-08-05 | Stop reason: HOSPADM

## 2021-08-05 RX ORDER — ONDANSETRON 2 MG/ML
4 INJECTION INTRAMUSCULAR; INTRAVENOUS EVERY 6 HOURS PRN
Status: DISCONTINUED | OUTPATIENT
Start: 2021-08-05 | End: 2021-08-05 | Stop reason: HOSPADM

## 2021-08-05 RX ORDER — ASPIRIN 81 MG/1
81 TABLET ORAL DAILY
Status: DISCONTINUED | OUTPATIENT
Start: 2021-08-06 | End: 2021-08-06 | Stop reason: HOSPADM

## 2021-08-05 RX ORDER — ALPRAZOLAM 0.25 MG/1
0.25 TABLET ORAL 3 TIMES DAILY PRN
Status: DISCONTINUED | OUTPATIENT
Start: 2021-08-05 | End: 2021-08-06 | Stop reason: HOSPADM

## 2021-08-05 RX ORDER — GABAPENTIN 300 MG/1
300 CAPSULE ORAL 3 TIMES DAILY
Status: DISCONTINUED | OUTPATIENT
Start: 2021-08-05 | End: 2021-08-06 | Stop reason: HOSPADM

## 2021-08-05 RX ORDER — HEPARIN SODIUM 5000 [USP'U]/ML
5000 INJECTION, SOLUTION INTRAVENOUS; SUBCUTANEOUS EVERY 12 HOURS SCHEDULED
Status: DISCONTINUED | OUTPATIENT
Start: 2021-08-06 | End: 2021-08-06 | Stop reason: HOSPADM

## 2021-08-05 RX ORDER — LIDOCAINE HYDROCHLORIDE 10 MG/ML
INJECTION, SOLUTION EPIDURAL; INFILTRATION; INTRACAUDAL; PERINEURAL AS NEEDED
Status: DISCONTINUED | OUTPATIENT
Start: 2021-08-05 | End: 2021-08-05 | Stop reason: HOSPADM

## 2021-08-05 RX ORDER — DEXTROSE MONOHYDRATE 25 G/50ML
25 INJECTION, SOLUTION INTRAVENOUS
Status: DISCONTINUED | OUTPATIENT
Start: 2021-08-05 | End: 2021-08-06 | Stop reason: HOSPADM

## 2021-08-05 RX ORDER — RANOLAZINE 500 MG/1
500 TABLET, EXTENDED RELEASE ORAL DAILY
Status: DISCONTINUED | OUTPATIENT
Start: 2021-08-06 | End: 2021-08-06 | Stop reason: HOSPADM

## 2021-08-05 RX ORDER — NICOTINE POLACRILEX 4 MG
15 LOZENGE BUCCAL
Status: DISCONTINUED | OUTPATIENT
Start: 2021-08-05 | End: 2021-08-06 | Stop reason: HOSPADM

## 2021-08-05 RX ORDER — SODIUM CHLORIDE 9 MG/ML
1-3 INJECTION, SOLUTION INTRAVENOUS CONTINUOUS
Status: DISCONTINUED | OUTPATIENT
Start: 2021-08-05 | End: 2021-08-06 | Stop reason: HOSPADM

## 2021-08-05 RX ORDER — LISINOPRIL 20 MG/1
20 TABLET ORAL DAILY
Status: DISCONTINUED | OUTPATIENT
Start: 2021-08-06 | End: 2021-08-06 | Stop reason: HOSPADM

## 2021-08-05 RX ORDER — ONDANSETRON 4 MG/1
4 TABLET, FILM COATED ORAL EVERY 6 HOURS PRN
Status: DISCONTINUED | OUTPATIENT
Start: 2021-08-05 | End: 2021-08-06 | Stop reason: HOSPADM

## 2021-08-05 RX ORDER — MIDAZOLAM HYDROCHLORIDE 1 MG/ML
INJECTION INTRAMUSCULAR; INTRAVENOUS AS NEEDED
Status: DISCONTINUED | OUTPATIENT
Start: 2021-08-05 | End: 2021-08-05 | Stop reason: HOSPADM

## 2021-08-05 RX ORDER — ACETAMINOPHEN 325 MG/1
650 TABLET ORAL EVERY 4 HOURS PRN
Status: DISCONTINUED | OUTPATIENT
Start: 2021-08-05 | End: 2021-08-06 | Stop reason: HOSPADM

## 2021-08-05 RX ORDER — DIPHENHYDRAMINE HYDROCHLORIDE 50 MG/ML
25 INJECTION INTRAMUSCULAR; INTRAVENOUS EVERY 6 HOURS PRN
Status: DISCONTINUED | OUTPATIENT
Start: 2021-08-05 | End: 2021-08-06 | Stop reason: HOSPADM

## 2021-08-05 RX ORDER — SODIUM CHLORIDE 0.9 % (FLUSH) 0.9 %
10 SYRINGE (ML) INJECTION EVERY 12 HOURS SCHEDULED
Status: DISCONTINUED | OUTPATIENT
Start: 2021-08-05 | End: 2021-08-06 | Stop reason: HOSPADM

## 2021-08-05 RX ORDER — NITROGLYCERIN 0.4 MG/1
0.4 TABLET SUBLINGUAL
Status: DISCONTINUED | OUTPATIENT
Start: 2021-08-05 | End: 2021-08-05 | Stop reason: HOSPADM

## 2021-08-05 RX ORDER — MORPHINE SULFATE 2 MG/ML
1 INJECTION, SOLUTION INTRAMUSCULAR; INTRAVENOUS ONCE
Status: DISCONTINUED | OUTPATIENT
Start: 2021-08-05 | End: 2021-08-06 | Stop reason: HOSPADM

## 2021-08-05 RX ORDER — SODIUM CHLORIDE 9 MG/ML
3 INJECTION, SOLUTION INTRAVENOUS CONTINUOUS
Status: ACTIVE | OUTPATIENT
Start: 2021-08-05 | End: 2021-08-05

## 2021-08-05 RX ORDER — SODIUM CHLORIDE 0.9 % (FLUSH) 0.9 %
1-10 SYRINGE (ML) INJECTION AS NEEDED
Status: DISCONTINUED | OUTPATIENT
Start: 2021-08-05 | End: 2021-08-05 | Stop reason: HOSPADM

## 2021-08-05 RX ORDER — ASPIRIN 325 MG
325 TABLET, DELAYED RELEASE (ENTERIC COATED) ORAL DAILY
Status: DISCONTINUED | OUTPATIENT
Start: 2021-08-06 | End: 2021-08-05 | Stop reason: HOSPADM

## 2021-08-05 RX ORDER — ALPRAZOLAM 0.5 MG/1
0.5 TABLET ORAL 2 TIMES DAILY
Status: DISCONTINUED | OUTPATIENT
Start: 2021-08-05 | End: 2021-08-06 | Stop reason: HOSPADM

## 2021-08-05 RX ORDER — ONDANSETRON 2 MG/ML
4 INJECTION INTRAMUSCULAR; INTRAVENOUS EVERY 6 HOURS PRN
Status: DISCONTINUED | OUTPATIENT
Start: 2021-08-05 | End: 2021-08-06 | Stop reason: HOSPADM

## 2021-08-05 RX ORDER — CLOPIDOGREL BISULFATE 75 MG/1
75 TABLET ORAL DAILY
Status: DISCONTINUED | OUTPATIENT
Start: 2021-08-06 | End: 2021-08-06 | Stop reason: HOSPADM

## 2021-08-05 RX ORDER — FENTANYL CITRATE 50 UG/ML
INJECTION, SOLUTION INTRAMUSCULAR; INTRAVENOUS AS NEEDED
Status: DISCONTINUED | OUTPATIENT
Start: 2021-08-05 | End: 2021-08-05 | Stop reason: HOSPADM

## 2021-08-05 RX ORDER — SODIUM CHLORIDE 0.9 % (FLUSH) 0.9 %
3 SYRINGE (ML) INJECTION EVERY 12 HOURS SCHEDULED
Status: DISCONTINUED | OUTPATIENT
Start: 2021-08-05 | End: 2021-08-05 | Stop reason: HOSPADM

## 2021-08-05 RX ORDER — ATORVASTATIN CALCIUM 20 MG/1
20 TABLET, FILM COATED ORAL NIGHTLY
Status: DISCONTINUED | OUTPATIENT
Start: 2021-08-05 | End: 2021-08-06 | Stop reason: HOSPADM

## 2021-08-05 RX ORDER — HYDROCODONE BITARTRATE AND ACETAMINOPHEN 5; 325 MG/1; MG/1
1 TABLET ORAL EVERY 6 HOURS PRN
Status: DISCONTINUED | OUTPATIENT
Start: 2021-08-05 | End: 2021-08-06 | Stop reason: HOSPADM

## 2021-08-05 RX ORDER — SODIUM CHLORIDE 0.9 % (FLUSH) 0.9 %
10 SYRINGE (ML) INJECTION AS NEEDED
Status: DISCONTINUED | OUTPATIENT
Start: 2021-08-05 | End: 2021-08-06 | Stop reason: HOSPADM

## 2021-08-05 RX ORDER — PANTOPRAZOLE SODIUM 40 MG/1
40 TABLET, DELAYED RELEASE ORAL
Status: DISCONTINUED | OUTPATIENT
Start: 2021-08-06 | End: 2021-08-06 | Stop reason: HOSPADM

## 2021-08-05 RX ORDER — ONDANSETRON 2 MG/ML
INJECTION INTRAMUSCULAR; INTRAVENOUS AS NEEDED
Status: DISCONTINUED | OUTPATIENT
Start: 2021-08-05 | End: 2021-08-05 | Stop reason: HOSPADM

## 2021-08-05 RX ADMIN — HYDROCODONE BITARTRATE AND ACETAMINOPHEN 1 TABLET: 5; 325 TABLET ORAL at 16:47

## 2021-08-05 RX ADMIN — GABAPENTIN 300 MG: 300 CAPSULE ORAL at 16:46

## 2021-08-05 RX ADMIN — GABAPENTIN 300 MG: 300 CAPSULE ORAL at 21:26

## 2021-08-05 RX ADMIN — SODIUM CHLORIDE, PRESERVATIVE FREE 10 ML: 5 INJECTION INTRAVENOUS at 21:26

## 2021-08-05 RX ADMIN — ALPRAZOLAM 0.5 MG: 0.5 TABLET ORAL at 21:26

## 2021-08-05 RX ADMIN — SODIUM CHLORIDE 3 ML/KG/HR: 9 INJECTION, SOLUTION INTRAVENOUS at 08:55

## 2021-08-05 RX ADMIN — ATORVASTATIN CALCIUM 20 MG: 20 TABLET, FILM COATED ORAL at 21:26

## 2021-08-05 RX ADMIN — ACETAMINOPHEN 650 MG: 325 TABLET, FILM COATED ORAL at 21:26

## 2021-08-05 NOTE — PLAN OF CARE
Goal Outcome Evaluation:  Plan of Care Reviewed With: patient, spouse        Progress: no change  Outcome Summary: pt arrived from [facility] with improving hematoma to left AC, left radial site and left ulna site C/D/I. Hematoma vs fluid build up noted with dusky fingers that are cool, cap refill <3sec. Dr. Pryor aware, BP elevated but dr. pryor also aware, BP improved after PRN pain medication given. VSS otherwise. NSR on monitor.
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
## 2021-08-06 ENCOUNTER — APPOINTMENT (OUTPATIENT)
Dept: CARDIOLOGY | Facility: HOSPITAL | Age: 61
End: 2021-08-06

## 2021-08-06 VITALS
BODY MASS INDEX: 22.68 KG/M2 | SYSTOLIC BLOOD PRESSURE: 148 MMHG | RESPIRATION RATE: 16 BRPM | WEIGHT: 128 LBS | OXYGEN SATURATION: 100 % | DIASTOLIC BLOOD PRESSURE: 89 MMHG | TEMPERATURE: 99 F | HEART RATE: 64 BPM | HEIGHT: 63 IN

## 2021-08-06 LAB
ANION GAP SERPL CALCULATED.3IONS-SCNC: 9 MMOL/L (ref 5–15)
BH CV ECHO MEAS - BSA(HAYCOCK): 1.6 M^2
BH CV ECHO MEAS - BSA: 1.6 M^2
BH CV ECHO MEAS - BZI_BMI: 22.7 KILOGRAMS/M^2
BH CV ECHO MEAS - BZI_METRIC_HEIGHT: 160 CM
BH CV ECHO MEAS - BZI_METRIC_WEIGHT: 58.1 KG
BH CV UPPER VENOUS LEFT AXILLARY AUGMENT: NORMAL
BH CV UPPER VENOUS LEFT AXILLARY COMPRESS: NORMAL
BH CV UPPER VENOUS LEFT AXILLARY PHASIC: NORMAL
BH CV UPPER VENOUS LEFT AXILLARY SPONT: NORMAL
BH CV UPPER VENOUS LEFT BASILIC FOREARM COMPRESS: NORMAL
BH CV UPPER VENOUS LEFT BASILIC UPPER COMPRESS: NORMAL
BH CV UPPER VENOUS LEFT BRACHIAL AUGMENT: NORMAL
BH CV UPPER VENOUS LEFT BRACHIAL COMPRESS: NORMAL
BH CV UPPER VENOUS LEFT BRACHIAL PHASIC: NORMAL
BH CV UPPER VENOUS LEFT BRACHIAL SPONT: NORMAL
BH CV UPPER VENOUS LEFT CEPHALIC FOREARM COMPRESS: NORMAL
BH CV UPPER VENOUS LEFT CEPHALIC UPPER COMPRESS: NORMAL
BH CV UPPER VENOUS LEFT INTERNAL JUGULAR AUGMENT: NORMAL
BH CV UPPER VENOUS LEFT INTERNAL JUGULAR COMPRESS: NORMAL
BH CV UPPER VENOUS LEFT INTERNAL JUGULAR PHASIC: NORMAL
BH CV UPPER VENOUS LEFT INTERNAL JUGULAR SPONT: NORMAL
BH CV UPPER VENOUS LEFT RADIAL AUGMENT: NORMAL
BH CV UPPER VENOUS LEFT RADIAL COMPRESS: NORMAL
BH CV UPPER VENOUS LEFT SUBCLAVIAN AUGMENT: NORMAL
BH CV UPPER VENOUS LEFT SUBCLAVIAN COMPRESS: NORMAL
BH CV UPPER VENOUS LEFT SUBCLAVIAN PHASIC: NORMAL
BH CV UPPER VENOUS LEFT SUBCLAVIAN SPONT: NORMAL
BH CV UPPER VENOUS LEFT ULNAR AUGMENT: NORMAL
BH CV UPPER VENOUS LEFT ULNAR COMPRESS: NORMAL
BH CV UPPER VENOUS RIGHT INTERNAL JUGULAR AUGMENT: NORMAL
BH CV UPPER VENOUS RIGHT INTERNAL JUGULAR COMPRESS: NORMAL
BH CV UPPER VENOUS RIGHT INTERNAL JUGULAR PHASIC: NORMAL
BH CV UPPER VENOUS RIGHT INTERNAL JUGULAR SPONT: NORMAL
BH CV UPPER VENOUS RIGHT SUBCLAVIAN AUGMENT: NORMAL
BH CV UPPER VENOUS RIGHT SUBCLAVIAN COMPRESS: NORMAL
BH CV UPPER VENOUS RIGHT SUBCLAVIAN PHASIC: NORMAL
BH CV UPPER VENOUS RIGHT SUBCLAVIAN SPONT: NORMAL
BUN SERPL-MCNC: 11 MG/DL (ref 8–23)
BUN/CREAT SERPL: 11.1 (ref 7–25)
CALCIUM SPEC-SCNC: 8.8 MG/DL (ref 8.6–10.5)
CHLORIDE SERPL-SCNC: 103 MMOL/L (ref 98–107)
CO2 SERPL-SCNC: 25 MMOL/L (ref 22–29)
CREAT SERPL-MCNC: 0.99 MG/DL (ref 0.57–1)
DEPRECATED RDW RBC AUTO: 44 FL (ref 37–54)
ERYTHROCYTE [DISTWIDTH] IN BLOOD BY AUTOMATED COUNT: 12.9 % (ref 12.3–15.4)
GFR SERPL CREATININE-BSD FRML MDRD: 57 ML/MIN/1.73
GLUCOSE BLDC GLUCOMTR-MCNC: 163 MG/DL (ref 70–130)
GLUCOSE BLDC GLUCOMTR-MCNC: 191 MG/DL (ref 70–130)
GLUCOSE SERPL-MCNC: 145 MG/DL (ref 65–99)
HCT VFR BLD AUTO: 35.2 % (ref 34–46.6)
HGB BLD-MCNC: 11.6 G/DL (ref 12–15.9)
MCH RBC QN AUTO: 30.8 PG (ref 26.6–33)
MCHC RBC AUTO-ENTMCNC: 33 G/DL (ref 31.5–35.7)
MCV RBC AUTO: 93.4 FL (ref 79–97)
PLATELET # BLD AUTO: 213 10*3/MM3 (ref 140–450)
PMV BLD AUTO: 11.2 FL (ref 6–12)
POTASSIUM SERPL-SCNC: 3.8 MMOL/L (ref 3.5–5.2)
QT INTERVAL: 516 MS
QTC INTERVAL: 519 MS
RBC # BLD AUTO: 3.77 10*6/MM3 (ref 3.77–5.28)
SODIUM SERPL-SCNC: 137 MMOL/L (ref 136–145)
WBC # BLD AUTO: 9.74 10*3/MM3 (ref 3.4–10.8)

## 2021-08-06 PROCEDURE — 63710000001 LISINOPRIL 20 MG TABLET: Performed by: INTERNAL MEDICINE

## 2021-08-06 PROCEDURE — 85027 COMPLETE CBC AUTOMATED: CPT | Performed by: INTERNAL MEDICINE

## 2021-08-06 PROCEDURE — G0378 HOSPITAL OBSERVATION PER HR: HCPCS

## 2021-08-06 PROCEDURE — A9270 NON-COVERED ITEM OR SERVICE: HCPCS | Performed by: INTERNAL MEDICINE

## 2021-08-06 PROCEDURE — 63710000001 METOPROLOL SUCCINATE XL 25 MG TABLET SUSTAINED-RELEASE 24 HOUR: Performed by: INTERNAL MEDICINE

## 2021-08-06 PROCEDURE — 63710000001 HYDROCODONE-ACETAMINOPHEN 5-325 MG TABLET: Performed by: INTERNAL MEDICINE

## 2021-08-06 PROCEDURE — 63710000001 INSULIN LISPRO (HUMAN) PER 5 UNITS: Performed by: NURSE PRACTITIONER

## 2021-08-06 PROCEDURE — 63710000001 METOPROLOL SUCCINATE XL 50 MG TABLET SUSTAINED-RELEASE 24 HOUR: Performed by: INTERNAL MEDICINE

## 2021-08-06 PROCEDURE — 93010 ELECTROCARDIOGRAM REPORT: CPT | Performed by: INTERNAL MEDICINE

## 2021-08-06 PROCEDURE — 63710000001 CLOPIDOGREL 75 MG TABLET: Performed by: INTERNAL MEDICINE

## 2021-08-06 PROCEDURE — 82962 GLUCOSE BLOOD TEST: CPT

## 2021-08-06 PROCEDURE — A9270 NON-COVERED ITEM OR SERVICE: HCPCS | Performed by: NURSE PRACTITIONER

## 2021-08-06 PROCEDURE — 63710000001 PANTOPRAZOLE 40 MG TABLET DELAYED-RELEASE: Performed by: NURSE PRACTITIONER

## 2021-08-06 PROCEDURE — 93005 ELECTROCARDIOGRAM TRACING: CPT | Performed by: INTERNAL MEDICINE

## 2021-08-06 PROCEDURE — 63710000001 ISOSORBIDE MONONITRATE 120 MG TABLET SUSTAINED-RELEASE 24 HOUR: Performed by: INTERNAL MEDICINE

## 2021-08-06 PROCEDURE — 93971 EXTREMITY STUDY: CPT

## 2021-08-06 PROCEDURE — 63710000001 ASPIRIN 81 MG TABLET DELAYED-RELEASE: Performed by: INTERNAL MEDICINE

## 2021-08-06 PROCEDURE — 63710000001 RANOLAZINE 500 MG TABLET SUSTAINED-RELEASE 12 HOUR: Performed by: INTERNAL MEDICINE

## 2021-08-06 PROCEDURE — 63710000001 GABAPENTIN 300 MG CAPSULE: Performed by: INTERNAL MEDICINE

## 2021-08-06 PROCEDURE — 93971 EXTREMITY STUDY: CPT | Performed by: INTERNAL MEDICINE

## 2021-08-06 PROCEDURE — 99213 OFFICE O/P EST LOW 20 MIN: CPT | Performed by: INTERNAL MEDICINE

## 2021-08-06 PROCEDURE — 80048 BASIC METABOLIC PNL TOTAL CA: CPT | Performed by: INTERNAL MEDICINE

## 2021-08-06 PROCEDURE — 63710000001 ALPRAZOLAM 0.5 MG TABLET: Performed by: INTERNAL MEDICINE

## 2021-08-06 RX ADMIN — RANOLAZINE 500 MG: 500 TABLET, EXTENDED RELEASE ORAL at 08:36

## 2021-08-06 RX ADMIN — PANTOPRAZOLE SODIUM 40 MG: 40 TABLET, DELAYED RELEASE ORAL at 08:30

## 2021-08-06 RX ADMIN — ASPIRIN 81 MG: 81 TABLET, COATED ORAL at 08:29

## 2021-08-06 RX ADMIN — ISOSORBIDE MONONITRATE 120 MG: 120 TABLET, EXTENDED RELEASE ORAL at 08:30

## 2021-08-06 RX ADMIN — INSULIN LISPRO 2 UNITS: 100 INJECTION, SOLUTION INTRAVENOUS; SUBCUTANEOUS at 08:32

## 2021-08-06 RX ADMIN — METOPROLOL SUCCINATE 75 MG: 50 TABLET, EXTENDED RELEASE ORAL at 08:29

## 2021-08-06 RX ADMIN — CLOPIDOGREL BISULFATE 75 MG: 75 TABLET ORAL at 08:30

## 2021-08-06 RX ADMIN — HYDROCODONE BITARTRATE AND ACETAMINOPHEN 1 TABLET: 5; 325 TABLET ORAL at 03:51

## 2021-08-06 RX ADMIN — LISINOPRIL 20 MG: 20 TABLET ORAL at 08:30

## 2021-08-06 RX ADMIN — ALPRAZOLAM 0.5 MG: 0.5 TABLET ORAL at 08:29

## 2021-08-06 RX ADMIN — GABAPENTIN 300 MG: 300 CAPSULE ORAL at 08:29

## 2021-08-06 RX ADMIN — SODIUM CHLORIDE, PRESERVATIVE FREE 10 ML: 5 INJECTION INTRAVENOUS at 08:32
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
## 2021-08-06 NOTE — DISCHARGE INSTR - OTHER ORDERS
Call [facility] to be placed on a list for a C pap [phone]. Call your PCP for a walker order if needed.

## 2021-08-06 NOTE — DISCHARGE SUMMARY
"  [facility] at [facility]   Inpatient Progress Note/Discharge       LOS: 0 days   Patient Care Team:  Oswald Cedillo MD as PCP - General  Oswald Cedillo MD as PCP - Family Medicine  ReillyAmirah everett MD as Consulting Physician (Internal Medicine)    Chief Complaint: Follow-up for CAD and left arm hematoma.    Subjective     Interval History:   Patient is a 61-year-old  female who underwent cardiac catheterization with subsequent intervention as documented below.  Postprocedure she developed significant left arm hematoma.  She was kept overnight for observation.  Thus far hematoma has been stable and improving.  Patient denies any chest pain, pressure.  She was felt to have reached maximum benefit from her stay in the hospital and ready for discharge home.      Review of Systems:   Pertinent positives noted in history, exam, and assessment. Otherwise reviewed and negative.      Objective     Vitals:  Blood pressure 137/66, pulse 61, temperature 98.4 °F (36.9 °C), temperature source Oral, resp. rate 16, height 160 cm (63\"), weight 58.1 kg (128 lb 1.6 oz), SpO2 100 %.     Vitals reviewed.   Constitutional:       Appearance: Well-developed and not in distress. Chronically ill-appearing.   Neck:      Vascular: No JVD.      Trachea: No tracheal deviation.   Pulmonary:      Effort: Pulmonary effort is normal.      Breath sounds: Normal breath sounds.   Cardiovascular:      Normal rate. Regular rhythm.      Comments: Left radial access site with significantly improved left forearm hematoma.  Much softer than last evening.  Ecchymosis has spread upwards, but the area of hematoma is smaller..  Edema noted throughout forearm.  Patient with normal sensation and function of her left hand.  Patient with appropriate cap refill.  No evidence of ischemia.  Edema:     Peripheral edema absent.   Abdominal:      General: Bowel sounds are normal.      Palpations: Abdomen is soft.      Tenderness: There is no " abdominal tenderness.   Musculoskeletal:         General: No deformity. Skin:     General: Skin is warm and dry.   Neurological:      Mental Status: Alert and oriented to person, place, and time.       I have examined the patient and agree with the above       Results Review:     I reviewed the patient's new clinical results.    Results from last 7 days   Lab Units 08/06/21  0312   WBC 10*3/mm3 9.74   HEMOGLOBIN g/dL 11.6*   HEMATOCRIT % 35.2   PLATELETS 10*3/mm3 213     Results from last 7 days   Lab Units 08/06/21  0312 08/05/21  0857 08/05/21  0857   SODIUM mmol/L 137   < > 142   POTASSIUM mmol/L 3.8   < > 4.7   CHLORIDE mmol/L 103   < > 103   CO2 mmol/L 25.0   < > 26.0   BUN mg/dL 11   < > 12   CREATININE mg/dL 0.99   < > 0.95   CALCIUM mg/dL 8.8   < > 9.8   BILIRUBIN mg/dL  --   --  0.5   ALK PHOS U/L  --   --  57   ALT (SGPT) U/L  --   --  7   AST (SGOT) U/L  --   --  15   GLUCOSE mg/dL 145*   < > 228*    < > = values in this interval not displayed.     Results from last 7 days   Lab Units 08/06/21  0312   SODIUM mmol/L 137   POTASSIUM mmol/L 3.8   CHLORIDE mmol/L 103   CO2 mmol/L 25.0   BUN mg/dL 11   CREATININE mg/dL 0.99   GLUCOSE mg/dL 145*   CALCIUM mg/dL 8.8         No results found for: TROPONINI      Results from last 7 days   Lab Units 08/05/21  0857   CHOLESTEROL mg/dL 164   TRIGLYCERIDES mg/dL 248*   HDL CHOL mg/dL 32*   LDL CHOL mg/dL 90         LHC:  · Patent LIMA graft with 95% distal LAD stenosis. Stented with 2.5 x 12 and 2.5 x 23 [place]ona AVINASH. Complicated by distal dissection as well as proximal dissection in the ostium the LIMA graft and the distal portion of the LIMA graft. The LIMA graft received a 3.5 x 23 and the ostium, and a 3.25 x 28 in the midportion Xience stents respectively.  · Occluded RCA with occluded RCA vein graft. Widely patent collaterals from the LAD.  · Widely patent SVG to OM 2  · LVEF 50% with inferior hypokinesis    Tele: Sinus rhythm    Assessment/Plan       Preoperative  cardiovascular examination      1. Pre op aortobifemoral bypass surgery with Dr. Vasques. Significant bilateral claudication symptoms.  2. Angina pectoris despite maximum medical therapy  1. [facility] 8/5/2021 as above.  3. CAD with previous CABG and stenting  4. Hypertension, stable  5. Dyslipidemia  6. Diabetes  7. Ongoing tobacco abuse    Plan:  1. Patient overall stable from cardiac standpoint.  2. Will need eventual aortobifem by Dr. Vasques.  With multiple new stents ideally would wait 6-8 weeks  3. Discharge to home today.  4. Follow-up with Dr. Reilly/Harjinder in 1 to 2 weeks.    EARLENE Gonzalez   Dictated utilizing Dragon dictation  I have seen and examined the patient, I have reviewed the note, discussed the case with the advance practice clinician, made necessary changes and I agree with the final note.    Beau Frazier MD  08/06/21  11:11 EDT

## 2021-08-06 NOTE — PLAN OF CARE
Bruising and edema from heart cath noted to be increasing and extending further up the patient's left arm. Fingers/hand/forearm cool to touch, capillary refill <3 seconds, pulses palpable, no numbness or tingling present. Cardiology paged, venous and arterial ultrasound of left upper extremity to be completed. Patient has not complained of any increased pain, BP is stable, NSR to Sinus kalli on tele, afebrile. Current area of swelling and bruising has been remarked.      Problem: Adult Inpatient Plan of Care  Goal: Plan of Care Review  Outcome: Ongoing, Progressing  Goal: Patient-Specific Goal (Individualized)  Outcome: Ongoing, Progressing  Goal: Absence of Hospital-Acquired Illness or Injury  Outcome: Ongoing, Progressing  Intervention: Identify and Manage Fall Risk  Recent Flowsheet Documentation  Taken 8/6/2021 0200 by Saida Salmeron RN  Safety Promotion/Fall Prevention:   nonskid shoes/slippers when out of bed   safety round/check completed   activity supervised   assistive device/personal items within reach   fall prevention program maintained  Taken 8/6/2021 0000 by Saida Salmeron RN  Safety Promotion/Fall Prevention:   activity supervised   assistive device/personal items within reach   fall prevention program maintained   nonskid shoes/slippers when out of bed   safety round/check completed  Taken 8/5/2021 2200 by Saida Salmeron RN  Safety Promotion/Fall Prevention:   safety round/check completed   nonskid shoes/slippers when out of bed   activity supervised   assistive device/personal items within reach   fall prevention program maintained  Taken 8/5/2021 2000 by Saida Salmeron RN  Safety Promotion/Fall Prevention:   safety round/check completed   nonskid shoes/slippers when out of bed   activity supervised   assistive device/personal items within reach   fall prevention program maintained  Intervention: Prevent Skin Injury  Recent Flowsheet Documentation  Taken 8/6/2021 0400 by Saida Salmeron RN  Body Position:  position changed independently  Taken 8/6/2021 0200 by Saida Salmeron RN  Body Position:   supine   position changed independently  Taken 8/6/2021 0000 by Saida Salmeron RN  Body Position: position changed independently  Taken 8/5/2021 2200 by Saida Salmeron RN  Body Position:   side-lying, left   position changed independently  Taken 8/5/2021 2000 by Saida Salmeron RN  Body Position:   position changed independently   sitting up in bed  Goal: Optimal Comfort and Wellbeing  Outcome: Ongoing, Progressing  Intervention: Provide Person-Centered Care  Recent Flowsheet Documentation  Taken 8/5/2021 2000 by Saida Salmeron RN  Trust Relationship/Rapport:   care explained   choices provided   questions answered   questions encouraged   thoughts/feelings acknowledged  Goal: Readiness for Transition of Care  Outcome: Ongoing, Progressing     Problem: Fall Injury Risk  Goal: Absence of Fall and Fall-Related Injury  Outcome: Ongoing, Progressing  Intervention: Identify and Manage Contributors to Fall Injury Risk  Recent Flowsheet Documentation  Taken 8/5/2021 2000 by Saida Salmeron RN  Medication Review/Management: medications reviewed  Intervention: Promote Injury-Free Environment  Recent Flowsheet Documentation  Taken 8/6/2021 0200 by Saida Salmeron RN  Safety Promotion/Fall Prevention:   nonskid shoes/slippers when out of bed   safety round/check completed   activity supervised   assistive device/personal items within reach   fall prevention program maintained  Taken 8/6/2021 0000 by Saida Salmeron RN  Safety Promotion/Fall Prevention:   activity supervised   assistive device/personal items within reach   fall prevention program maintained   nonskid shoes/slippers when out of bed   safety round/check completed  Taken 8/5/2021 2200 by Saida Salmeron RN  Safety Promotion/Fall Prevention:   safety round/check completed   nonskid shoes/slippers when out of bed   activity supervised   assistive device/personal items within reach   fall prevention  program maintained  Taken 8/5/2021 2000 by Saida Salmeron, RN  Safety Promotion/Fall Prevention:   safety round/check completed   nonskid shoes/slippers when out of bed   activity supervised   assistive device/personal items within reach   fall prevention program maintained     Problem: Diabetes Comorbidity  Goal: Blood Glucose Level Within Desired Range  Outcome: Ongoing, Progressing     Problem: Hypertension Comorbidity  Goal: Blood Pressure in Desired Range  Outcome: Ongoing, Progressing  Intervention: Maintain Hypertension-Management Strategies  Recent Flowsheet Documentation  Taken 8/5/2021 2000 by Saida Salmeron, RN  Medication Review/Management: medications reviewed

## 2021-08-09 ENCOUNTER — DOCUMENTATION (OUTPATIENT)
Dept: CARDIAC REHAB | Facility: HOSPITAL | Age: 61
End: 2021-08-09

## 2021-08-09 NOTE — PROGRESS NOTES
Cardiac Rehab staff mailed referral letter to patient regarding Phase II Cardiac Rehab program. Instruction for patient to contact [facility] Cardiac Rehab Department for additional program information and to forward referral to closest facility.

## 2021-08-13 ENCOUNTER — OFFICE VISIT (OUTPATIENT)
Dept: CARDIOLOGY | Facility: HOSPITAL | Age: 61
End: 2021-08-13

## 2021-08-13 VITALS
WEIGHT: 125.38 LBS | RESPIRATION RATE: 20 BRPM | OXYGEN SATURATION: 97 % | HEART RATE: 82 BPM | TEMPERATURE: 97.2 F | BODY MASS INDEX: 22.21 KG/M2 | SYSTOLIC BLOOD PRESSURE: 112 MMHG | HEIGHT: 63 IN | DIASTOLIC BLOOD PRESSURE: 71 MMHG

## 2021-08-13 DIAGNOSIS — I73.9 PVD (PERIPHERAL VASCULAR DISEASE) (HCC): ICD-10-CM

## 2021-08-13 DIAGNOSIS — E78.5 HYPERLIPIDEMIA, UNSPECIFIED HYPERLIPIDEMIA TYPE: ICD-10-CM

## 2021-08-13 DIAGNOSIS — I25.810 CORONARY ARTERY DISEASE INVOLVING CORONARY BYPASS GRAFT OF NATIVE HEART WITHOUT ANGINA PECTORIS: Primary | ICD-10-CM

## 2021-08-13 DIAGNOSIS — I10 ESSENTIAL HYPERTENSION: ICD-10-CM

## 2021-08-13 PROCEDURE — 99214 OFFICE O/P EST MOD 30 MIN: CPT | Performed by: NURSE PRACTITIONER

## 2021-08-19 NOTE — PROGRESS NOTES
"Chief Complaint  Peripheral Vascular Disease and Establish Care    Subjective    History of Present Illness {CC  Problem List  Visit  Diagnosis   Encounters  Notes  Medications  Labs  Result Review Imaging  Media :23}       History of Present Illness   · 61-year-old female presents to the office today for ongoing evaluation of her coronary artery disease.  Patient underwent a heart cath per Dr. Frazier on 8/5/2021.  LIMA graft with 95% distal LAD stenosis stented with [place] drug-eluting stent.Complicated by distal dissection as well as proximal dissection in the ostium the LIMA graft and the distal portion of the LIMA graft. The LIMA graft received a 3.5 x 23 and the ostium, and a 3.25 x 28 in the midportion Xience stents respectively.  · Occluded RCA with occluded RCA vein graft. Widely patent collaterals from the LAD.  · Widely patent SVG to OM 2, LVEF 50% with inferior hypokinesis    patient developed hematoma after left heart catheterization.  She was kept overnight for observation.  She reports that her arm has improved significantly (the left) since discharge from the hospital.  She still reports tenderness in the arm but notes ecchymosis is improving.  Currently denies chest pain, dizziness, orthopnea, PND or pedal edema  Objective     Vital Signs:   Vitals:    08/13/21 0935 08/13/21 0937   BP: 109/62 112/71   BP Location: Right arm Right arm   Patient Position: Sitting Standing   Cuff Size: Adult Adult   Pulse: 73 82   Resp:  20   Temp:  97.2 °F (36.2 °C)   TempSrc:  Temporal   SpO2: 97% 97%   Weight:  56.9 kg (125 lb 6 oz)   Height:  160 cm (63\")     Body mass index is 22.21 kg/m².  Physical Exam  Vitals and nursing note reviewed.   Constitutional:       Appearance: Normal appearance.   HENT:      Head: Normocephalic.   Eyes:      Pupils: Pupils are equal, round, and reactive to light.   Cardiovascular:      Rate and Rhythm: Normal rate and regular rhythm.      Pulses: Normal pulses.      Heart " sounds: Normal heart sounds. No murmur heard.     Pulmonary:      Effort: Pulmonary effort is normal.      Breath sounds: Normal breath sounds.   Abdominal:      General: Bowel sounds are normal.      Palpations: Abdomen is soft.   Musculoskeletal:         General: Normal range of motion.      Cervical back: Normal range of motion.      Right lower leg: No edema.      Left lower leg: No edema.   Skin:     General: Skin is warm and dry.      Capillary Refill: Capillary refill takes less than 2 seconds.      Comments: Ecchymosis noted to left arm improving  No hematoma  Good cap refill, radial pulse strong   Neurological:      Mental Status: She is alert and oriented to person, place, and time.   Psychiatric:         Mood and Affect: Mood normal.         Thought Content: Thought content normal.              Result Review  Data Reviewed:{ Labs  Result Review  Imaging  Med Tab  Media :23}   Cardiac Catheterization/Vascular Study (08/05/2021 13:36)  SCANNED - TELEMETRY (08/05/2021)  SCANNED - CARDIOLOGY (08/05/2021)  ECG 12 Lead (08/06/2021 04:12)  Duplex Venous Upper Extremity - Left CAR (08/06/2021 15:00)  Lab Results   Component Value Date    GLUCOSE 145 (H) 08/06/2021    CALCIUM 8.8 08/06/2021     08/06/2021    K 3.8 08/06/2021    CO2 25.0 08/06/2021     08/06/2021    BUN 11 08/06/2021    CREATININE 0.99 08/06/2021    EGFRIFNONA 57 (L) 08/06/2021    BCR 11.1 08/06/2021    ANIONGAP 9.0 08/06/2021     Lab Results   Component Value Date    WBC 9.74 08/06/2021    HGB 11.6 (L) 08/06/2021    HCT 35.2 08/06/2021    MCV 93.4 08/06/2021     08/06/2021                Assessment and Plan {CC Problem List  Visit Diagnosis  ROS  Review (Popup)  Health Maintenance  Quality  BestPractice  Medications  SmartSets  SnapShot Encounters  Media :23}   1. Coronary artery disease involving coronary bypass graft of native heart without angina pectoris  Hematoma at left radial site has resolved  Continue  aspirin, Lipitor, Plavix, Toprol, Ranexa  2. PVD (peripheral vascular disease) (CMS/[facility])  Followed by Dr. Vasques    3. Essential hypertension  Well-controlled on lisinopril, Toprol    4. Hyperlipidemia, unspecified hyperlipidemia type  Statin therapy       Follow Up {Instructions Charge Capture  Follow-up Communications :23}   Return if symptoms worsen or fail to improve.    Patient was given instructions and counseling regarding her condition or for health maintenance advice. Please see specific information pulled into the AVS if appropriate.  Patient was instructed to call the [facility] with any questions, concerns, or worsening symptoms.    *Please note that portions of this note were completed with a voice recognition program. Efforts were made to edit the dictations, but occasionally words are mistranscribed.

## 2021-12-29 ENCOUNTER — HOSPITAL ENCOUNTER (OUTPATIENT)
Age: 61
End: 2021-12-29
Payer: MEDICARE

## 2021-12-29 DIAGNOSIS — E11.9: Primary | ICD-10-CM

## 2021-12-29 DIAGNOSIS — I10: ICD-10-CM

## 2021-12-29 DIAGNOSIS — E78.5: ICD-10-CM

## 2021-12-29 LAB
ALBUMIN LEVEL: 4.2 G/DL (ref 3.5–5)
ALBUMIN/GLOB SERPL: 1.5 {RATIO} (ref 1.1–1.8)
ALP ISO SERPL-ACNC: 61 U/L (ref 38–126)
ALT SERPLBLD-CCNC: 8 U/L (ref 12–78)
ANION GAP SERPL CALC-SCNC: 12.6 MEQ/L (ref 5–15)
AST SERPL QL: 22 U/L (ref 14–36)
BILIRUBIN,TOTAL: 0.6 MG/DL (ref 0.2–1.3)
BUN SERPL-MCNC: 18 MG/DL (ref 7–17)
CALCIUM SPEC-MCNC: 9.6 MG/DL (ref 8.4–10.2)
CHLORIDE SPEC-SCNC: 104 MMOL/L (ref 98–107)
CHOLEST SPEC-SCNC: 301 MG/DL (ref 140–200)
CO2 SERPL-SCNC: 28 MMOL/L (ref 22–30)
CREAT BLD-SCNC: 1 MG/DL (ref 0.52–1.04)
ESTIMATED GLOMERULAR FILT RATE: 56 ML/MIN (ref 60–?)
FT4I SERPL CALC-MCNC: 4.1 UG/DL (ref 5.93–13.13)
GFR (AFRICAN AMERICAN): 68 ML/MIN (ref 60–?)
GLOBULIN SER CALC-MCNC: 2.8 G/DL (ref 1.3–3.2)
GLUCOSE: 191 MG/DL (ref 74–100)
HBA1C MFR BLD: 7.9 % (ref 4–6)
HCT VFR BLD CALC: 48.6 % (ref 37–47)
HDLC SERPL-MCNC: 44 MG/DL (ref 40–60)
HGB BLD-MCNC: 15.6 G/DL (ref 12.2–16.2)
MCHC RBC-ENTMCNC: 32.2 G/DL (ref 31.8–35.4)
MCV RBC: 95.7 FL (ref 81–99)
MEAN CORPUSCULAR HEMOGLOBIN: 30.8 PG (ref 27–31.2)
PLATELET # BLD: 228 K/MM3 (ref 142–424)
POTASSIUM: 4.6 MMOL/L (ref 3.5–5.1)
PROT SERPL-MCNC: 7 G/DL (ref 6.3–8.2)
RBC # BLD AUTO: 5.07 M/MM3 (ref 4.2–5.4)
SODIUM SPEC-SCNC: 140 MMOL/L (ref 136–145)
T3RU NFR SERPL: 30 % (ref 23.5–40.5)
T4 (THYROXINE): 13.8 UG/DL (ref 5.53–11)
TRIGLYCERIDES: 260 MG/DL (ref 30–150)
TSH SERPL-ACNC: 1.17 UIU/ML (ref 0.47–4.68)
WBC # BLD AUTO: 6.3 K/MM3 (ref 4.8–10.8)

## 2021-12-29 PROCEDURE — 84436 ASSAY OF TOTAL THYROXINE: CPT

## 2021-12-29 PROCEDURE — 36415 COLL VENOUS BLD VENIPUNCTURE: CPT

## 2021-12-29 PROCEDURE — 84443 ASSAY THYROID STIM HORMONE: CPT

## 2021-12-29 PROCEDURE — 84479 ASSAY OF THYROID (T3 OR T4): CPT

## 2021-12-29 PROCEDURE — 80053 COMPREHEN METABOLIC PANEL: CPT

## 2021-12-29 PROCEDURE — 85025 COMPLETE CBC W/AUTO DIFF WBC: CPT

## 2021-12-29 PROCEDURE — 80061 LIPID PANEL: CPT

## 2021-12-29 PROCEDURE — 83036 HEMOGLOBIN GLYCOSYLATED A1C: CPT

## 2022-01-17 ENCOUNTER — OFFICE VISIT (OUTPATIENT)
Dept: CARDIAC SURGERY | Facility: CLINIC | Age: 62
End: 2022-01-17

## 2022-01-17 VITALS
TEMPERATURE: 98 F | WEIGHT: 130.2 LBS | OXYGEN SATURATION: 98 % | HEART RATE: 64 BPM | SYSTOLIC BLOOD PRESSURE: 135 MMHG | DIASTOLIC BLOOD PRESSURE: 80 MMHG | HEIGHT: 63 IN | BODY MASS INDEX: 23.07 KG/M2

## 2022-01-17 DIAGNOSIS — I73.9 PVD (PERIPHERAL VASCULAR DISEASE): Primary | ICD-10-CM

## 2022-01-17 PROCEDURE — 99215 OFFICE O/P EST HI 40 MIN: CPT | Performed by: NURSE PRACTITIONER

## 2022-01-17 NOTE — PROGRESS NOTES
"     [facility] Cardiothoracic Surgery Office Follow Up Note     Date of Encounter: 2022     Name: Paula Valencia  : 1960     Referred By: No ref. provider found  PCP: Oswald Cedillo MD    Chief Complaint:    Chief Complaint   Patient presents with   • Peripheral Vascular Disease     5 month follow-up to discuss surgery. Patient continues to have bilateral claudication        Subjective      History of Present Illness:    Paula Valencia is a 61 y.o. female current smoker with history of HTN, HLD on statin therapy, insulin-dependent DM (HA1C 8.8), CKD, CAD s/p remote CABG in , and PVD s/p formal aortogram with runoff on 2021 by Dr. Vasques. Aortogram revealed significant calcifications to bilateral common iliac arteries, 90% or subtotal stenosis bilaterally.  Bypass scenario discussed with patient at our last visit 2021 with plans for cardiac clearance with Dr Frazier. Unfortunately, during cardiac clearance with Dr Frazier pt required multiple drug eluding stents 2021. After Dr Vasques discussed with Dr Grande, they are recommending postponing upcoming surgery in light of inability to hold plavix until 6 months out from cardiac stenting. She presents as 6 month follow-up to revisit revascularization.  She continues to complain of intermittent claudication (R=L) that she describes as mejia horses to posterior calves and soreness/cramping in her thighs.  She has difficulty walking long distances, and reports problems from her car to our elevators.  She has no history of delayed wound healing or BLE wounds/ulcers. Of note pt states she had an episode of chest pain on  after getting \"worked-up\" or stressed and she use nitro with relief of symptoms. She reports her primary cardiologist is Dr Reilly who she has not seen since her heart stents.     Review of Systems:  Review of Systems   Constitutional: Negative for chills, decreased appetite, diaphoresis, fever, " malaise/fatigue, night sweats, weight gain and weight loss.   HENT: Negative for hoarse voice.    Eyes: Positive for blurred vision. Negative for double vision and visual disturbance.   Cardiovascular: Positive for chest pain (took nitro last night-1/16/22), claudication and dyspnea on exertion. Negative for irregular heartbeat, leg swelling, near-syncope, orthopnea, palpitations, paroxysmal nocturnal dyspnea and syncope.   Respiratory: Positive for cough. Negative for hemoptysis, shortness of breath, sputum production and wheezing.    Hematologic/Lymphatic: Negative for adenopathy and bleeding problem. Bruises/bleeds easily.   Skin: Negative for color change, nail changes, poor wound healing and rash.   Musculoskeletal: Negative for back pain, falls and muscle cramps.   Gastrointestinal: Negative for dysphagia and heartburn. Abdominal pain: lower abdomen.   Genitourinary: Negative for flank pain.   Neurological: Positive for dizziness, loss of balance and numbness (tingling in fingers). Negative for brief paralysis, disturbances in coordination, focal weakness, headaches, light-headedness, paresthesias, sensory change, vertigo and weakness.   Psychiatric/Behavioral: Negative for depression and suicidal ideas. The patient is nervous/anxious.    Allergic/Immunologic: Negative for persistent infections.       I have reviewed the following portions of the patient's history: allergies, current medications, past family history, past medical history, past social history, past surgical history and problem list and confirm it's accurate.    Allergies:  No Known Allergies    Medications:      Current Outpatient Medications:   •  ALPRAZolam (XANAX) 0.5 MG tablet, Take 0.5 mg by mouth 2 (Two) Times a Day. for anxiety, Disp: , Rfl:   •  aspirin 81 MG EC tablet, Take 81 mg by mouth Daily., Disp: , Rfl:   •  atorvastatin (LIPITOR) 80 MG tablet, Take 80 mg by mouth Daily., Disp: , Rfl:   •  clopidogrel (PLAVIX) 75 MG tablet, Take  75 mg by mouth Daily. Coronary stent x 1, Disp: , Rfl:   •  ferrous sulfate 325 (65 FE) MG tablet, Take 325 mg by mouth Daily With Breakfast., Disp: , Rfl:   •  gabapentin (NEURONTIN) 300 MG capsule, Take 300 mg by mouth 3 (Three) Times a Day., Disp: , Rfl:   •  insulin glargine (LANTUS, SEMGLEE) 100 UNIT/ML injection, Inject 15 Units under the skin into the appropriate area as directed Daily. For blood glucose greater than or equal to 400, Disp: , Rfl:   •  insulin NPH-insulin regular (humuLIN 70/30,novoLIN 70/30) (70-30) 100 UNIT/ML injection, Inject  under the skin into the appropriate area as directed As Needed., Disp: , Rfl:   •  isosorbide mononitrate (IMDUR) 120 MG 24 hr tablet, Take 120 mg by mouth Daily., Disp: , Rfl:   •  lisinopril (PRINIVIL,ZESTRIL) 20 MG tablet, Take 20 mg by mouth Daily., Disp: , Rfl:   •  metFORMIN (GLUCOPHAGE) 500 MG tablet, Take 1,000 mg by mouth 2 (Two) Times a Day With Meals., Disp: , Rfl:   •  metoprolol succinate XL (TOPROL-XL) 50 MG 24 hr tablet, Take 75 mg by mouth Daily., Disp: , Rfl:   •  nitroglycerin (NITROLINGUAL) 0.4 MG/SPRAY spray, Place 1 spray under the tongue As Needed., Disp: , Rfl:   •  ranolazine (RANEXA) 500 MG 12 hr tablet, Take 500 mg by mouth Daily., Disp: , Rfl:   •  tiZANidine (ZANAFLEX) 4 MG tablet, Take 4 mg by mouth 2 (two) times a day., Disp: , Rfl:   •  VITAMIN D PO, Take  by mouth., Disp: , Rfl:     History:   Past Medical History:   Diagnosis Date   • Anxiety    • Arthritis    • Coronary artery disease    • Diabetes mellitus (HCC)    • GERD (gastroesophageal reflux disease)    • History of transfusion    • Hyperlipidemia    • Hypertension    • Peripheral vascular disease (HCC)    • Renal cyst     bilateral   • Sleep apnea     c-pap not in use since 02/2021       Past Surgical History:   Procedure Laterality Date   • AORTAGRAM N/A 6/16/2021    Procedure: ABDOMINAL AORTAGRAM;  Surgeon: Beau Vasques MD;  Location: [facility];  Service:  "Vascular;  Laterality: N/A;  FLUORO - 9 MIN   DOISE- 62MGY  40 ML VISIPAQUE 320     • BACK SURGERY     • CARDIAC CATHETERIZATION     • CARDIAC CATHETERIZATION Left 8/5/2021    Procedure: Left Heart Cath;  Surgeon: Beau Frazier MD;  Location: [facility] CATH INVASIVE LOCATION;  Service: Cardiovascular;  Laterality: Left;   • CHOLECYSTECTOMY     • COLONOSCOPY  2019   • CORONARY ARTERY BYPASS GRAFT      2013 approx at [facility] x 3    • CORONARY STENT PLACEMENT      after CABG    • ENDOSCOPY     • HYSTERECTOMY     • INGUINAL HERNIA REPAIR Right        Social History     Socioeconomic History   • Marital status:    • Number of children: 2   Tobacco Use   • Smoking status: Current Some Day Smoker     Packs/day: 0.50     Years: 40.00     Pack years: 20.00     Types: Cigarettes     Start date: 1981   • Smokeless tobacco: Never Used   Vaping Use   • Vaping Use: Never used   Substance and Sexual Activity   • Alcohol use: Not Currently   • Drug use: Never   • Sexual activity: Defer        Family History   Problem Relation Age of Onset   • Heart attack Mother    • Hypertension Mother    • Emphysema Mother    • Other Father         history unknown   • No Known Problems Sister    • No Known Problems Brother    • No Known Problems Maternal Grandmother    • No Known Problems Maternal Grandfather    • No Known Problems Paternal Grandmother    • No Known Problems Paternal Grandfather        Objective   Physical Exam:  Vitals:    01/17/22 1212   BP: 135/80   BP Location: Right arm   Patient Position: Sitting   Pulse: 64   Temp: 98 °F (36.7 °C)   SpO2: 98%   Weight: 59.1 kg (130 lb 3.2 oz)   Height: 160 cm (63\")      Body mass index is 23.06 kg/m².    Physical Exam  Vitals and nursing note reviewed.   Constitutional:       Appearance: Normal appearance. She is well-developed.   HENT:      Head: Normocephalic and atraumatic.   Eyes:      Pupils: Pupils are equal, round, and reactive to light.   Neck:      Vascular: No carotid " bruit.   Cardiovascular:      Rate and Rhythm: Normal rate and regular rhythm.      Pulses:           Dorsalis pedis pulses are detected w/ Doppler on the right side and detected w/ Doppler on the left side.        Posterior tibial pulses are detected w/ Doppler on the right side and detected w/ Doppler on the left side.      Heart sounds: Normal heart sounds, S1 normal and S2 normal. No murmur heard.      Pulmonary:      Effort: Pulmonary effort is normal.      Breath sounds: Normal breath sounds.   Abdominal:      Palpations: Abdomen is soft.   Musculoskeletal:         General: No swelling.      Cervical back: Neck supple.      Right lower leg: No edema.      Left lower leg: No edema.   Feet:      Comments: Toes: pink, warm, dry.   RLE foot cooler to touch than LLE foot  Skin:     General: Skin is warm and dry.      Capillary Refill: Capillary refill takes less than 2 seconds.      Findings: No bruising.   Neurological:      General: No focal deficit present.      Mental Status: She is alert and oriented to person, place, and time. Mental status is at baseline.      GCS: GCS eye subscore is 4. GCS verbal subscore is 5. GCS motor subscore is 6.      Motor: Motor function is intact.      Coordination: Coordination is intact.      Gait: Gait is intact.   Psychiatric:         Mood and Affect: Mood normal.         Speech: Speech normal.         Behavior: Behavior normal. Behavior is cooperative.         Cognition and Memory: Cognition normal.         Imaging/Labs:  Aortogram 6/16/2021 (Dr. Vasques):  Operative findings the patient had patent external iliac arteries bilaterally of the internal iliacs were patent both common iliac arteries were patent but there was extensive calcifications bilaterally.  The aorta was patent but there was approximately greater than 90% or a subtotal stenosis of the takeoff of the left common iliac artery.  There appeared to be at least a 90% stenosis at the takeoff of the right common  iliac artery as well.  The lower aorta was patent as well.       CTA abdominal aorta bilateral iliofemoral runoff 6/16/2021:  Vascular: Atherosclerotic but patent distal thoracic aorta. Atherosclerotic nonaneurysmal abdominal aorta. Patent celiac origin. Patent superior mesenteric artery, bilateral renal artery and inferior mesenteric artery origins. There is focal moderate to severe narrowing of the infrarenal abdominal aorta proximal to the bifurcation with some prominent calcific plaque. Patent bilateral common iliac arteries, moderately atherosclerotic. Atherosclerotic multifocal moderate to severe narrowing of bilateral internal and external iliac arteries. On the right, patent common, superficial and deep femoral arteries. The popliteal artery and proximal trifurcation vessels are patent. There is poor contrast opacification of the trifurcation vessels more distally, favored to relate to technical factors given its symmetry with the left. The left common, superficial and deep femoral arteries are patent. The popliteal artery is patent. The trifurcation vessels are poorly opacified, again possibly due in part to technical factors.   IMPRESSION: There is poor opacification of the trifurcation vessels noted bilaterally with essentially 0 vessel runoff to either ankle. This may represent a combination of poor perfusion due to more proximal multifocal atherosclerotic disease and technical factors given its symmetrical appearance. Consider correlation with sonographic duplex findings.  Multifocal moderate to severe atherosclerotic narrowing of the abdominal aorta with some focal severe atherosclerotic narrowing proximal to the iliac bifurcation. The common, external iliac and femoral arteries demonstrate some moderate multifocal atherosclerotic narrowing, otherwise patent. The popliteal arteries are patent bilaterally.       Assessment / Plan      Assessment / Plan:  Diagnoses and all orders for this visit:    1.  "PVD (peripheral vascular disease) ([facility]) (Primary)       · Current smoker with history of HTN, HLD on statin therapy, insulin-dependent DM (HA1C 8.8), CKD, CAD s/p remote CABG in 2013, and PVD s/p formal aortogram with runoff on 6/16/2021 by Dr. Vasques.   · Aortogram revealing significant calcifications to bilateral common iliac arteries, 90% or subtotal stenosis bilaterally.    · Bypass scenario discussed with patient at our last visit 7/2021 with plans for cardiac clearance with Dr Frazier.  · Unfortunately, during cardiac clearance with Dr Frazier pt required multiple drug eluding stents 8/5/2021.   · After discussing case with cardiology, procedure was postponed until Plavix could be held (6 months out from cardiac stenting)   · Presents as 6 month follow-up to revisit revascularization.  She continues to complain of intermittent claudication (R=L) that she describes as mejia horses to posterior calves and soreness/cramping in her thighs.  She has difficulty walking long distances, and reports problems from her car to our elevators.  She has no history of delayed wound healing or BLE wounds/ulcers.  · On exam she has dopplerable distal pedal pulses. RLE foot cooler to touch  · Dr Vasques present during encounter and examination. After reviewing previous aortogram, he is recommending trying AA with runoff with possible iliac stenting in an attempt to avoid AF bypass. Pt much prefers this less invasive approach  Risks of surgery were discussed with the patient including: bleeding, infection, blood clots, loss of limb, kidney damage, stroke, heart attack, or death.  Patient understands risks and agrees to proceed.  Of note pt states she had an episode of chest pain on Sunday after getting \"worked-up\" or stressed and she use nitro with relief of symptoms. She reports her primary cardiologist is Dr Reilly who she has not seen since her heart stents. Our office to facilitate appointment for re-evaluation prior to " scheduling procedure.       Patient Education:  Paula Valencia  reports that she has been smoking cigarettes. She started smoking about 41 years ago. She has a 20.00 pack-year smoking history. She has never used smokeless tobacco.. I have educated her on the risk of diseases from using tobacco products such as cancer, COPD and heart disease. I advised her to quit and she is not willing to quit.I spent 3  minutes counseling the patient.    Follow Up: pending  No follow-ups on file.   Or sooner for any further concerns or worsening sign and symptoms. If unable to reach us in the office please dial 911 or go to the nearest emergency department.      Ruby HENAO  [facility] Cardiothoracic Surgery    Time Spent: I spent 41 minutes caring for Paula on this date of service. This time includes time spent by me in the following activities: preparing for the visit, reviewing tests, obtaining and/or reviewing a separately obtained history, performing a medically appropriate examination and/or evaluation, counseling and educating the patient/family/caregiver, ordering medications, tests, or procedures, referring and communicating with other health care professionals, documenting information in the medical record, independently interpreting results and communicating that information with the patient/family/caregiver and care coordination.

## 2022-01-18 DIAGNOSIS — I73.9 PVD (PERIPHERAL VASCULAR DISEASE): Primary | ICD-10-CM

## 2022-02-08 ENCOUNTER — HOSPITAL ENCOUNTER (OUTPATIENT)
Age: 62
End: 2022-02-08
Payer: MEDICARE

## 2022-02-08 DIAGNOSIS — Z20.822: Primary | ICD-10-CM

## 2022-02-08 PROCEDURE — U0004 COV-19 TEST NON-CDC HGH THRU: HCPCS

## 2022-02-08 PROCEDURE — U0005 INFEC AGEN DETEC AMPLI PROBE: HCPCS

## 2022-02-08 PROCEDURE — C9803 HOPD COVID-19 SPEC COLLECT: HCPCS

## 2022-02-09 LAB — COVID-19 NASAL PCR SENDOUT LEX: NOT DETECTED

## 2022-02-17 ENCOUNTER — PREP FOR SURGERY (OUTPATIENT)
Dept: OTHER | Facility: HOSPITAL | Age: 62
End: 2022-02-17

## 2022-02-17 DIAGNOSIS — I73.9 PVD (PERIPHERAL VASCULAR DISEASE): Primary | ICD-10-CM

## 2022-02-28 ENCOUNTER — PRE-ADMISSION TESTING (OUTPATIENT)
Dept: PREADMISSION TESTING | Facility: HOSPITAL | Age: 62
End: 2022-02-28

## 2022-02-28 VITALS — HEIGHT: 63 IN | WEIGHT: 129.41 LBS | BODY MASS INDEX: 22.93 KG/M2

## 2022-02-28 DIAGNOSIS — I73.9 PVD (PERIPHERAL VASCULAR DISEASE): ICD-10-CM

## 2022-02-28 LAB
ANION GAP SERPL CALCULATED.3IONS-SCNC: 11 MMOL/L (ref 5–15)
APTT PPP: 27.1 SECONDS (ref 22–39)
BUN SERPL-MCNC: 11 MG/DL (ref 8–23)
BUN/CREAT SERPL: 10.6 (ref 7–25)
CALCIUM SPEC-SCNC: 9.6 MG/DL (ref 8.6–10.5)
CHLORIDE SERPL-SCNC: 102 MMOL/L (ref 98–107)
CO2 SERPL-SCNC: 28 MMOL/L (ref 22–29)
CREAT SERPL-MCNC: 1.04 MG/DL (ref 0.57–1)
DEPRECATED RDW RBC AUTO: 43.4 FL (ref 37–54)
EGFRCR SERPLBLD CKD-EPI 2021: 60.9 ML/MIN/1.73
ERYTHROCYTE [DISTWIDTH] IN BLOOD BY AUTOMATED COUNT: 12.6 % (ref 12.3–15.4)
GLUCOSE SERPL-MCNC: 191 MG/DL (ref 65–99)
HBA1C MFR BLD: 9.2 % (ref 4.8–5.6)
HCT VFR BLD AUTO: 43.9 % (ref 34–46.6)
HGB BLD-MCNC: 14.6 G/DL (ref 12–15.9)
INR PPP: 0.98 (ref 0.85–1.16)
MCH RBC QN AUTO: 31.1 PG (ref 26.6–33)
MCHC RBC AUTO-ENTMCNC: 33.3 G/DL (ref 31.5–35.7)
MCV RBC AUTO: 93.6 FL (ref 79–97)
PLATELET # BLD AUTO: 232 10*3/MM3 (ref 140–450)
PMV BLD AUTO: 11 FL (ref 6–12)
POTASSIUM SERPL-SCNC: 4.5 MMOL/L (ref 3.5–5.2)
PROTHROMBIN TIME: 12.7 SECONDS (ref 11.4–14.4)
QT INTERVAL: 464 MS
QTC INTERVAL: 455 MS
RBC # BLD AUTO: 4.69 10*6/MM3 (ref 3.77–5.28)
SARS-COV-2 RNA PNL SPEC NAA+PROBE: NOT DETECTED
SODIUM SERPL-SCNC: 141 MMOL/L (ref 136–145)
WBC NRBC COR # BLD: 8.34 10*3/MM3 (ref 3.4–10.8)

## 2022-02-28 PROCEDURE — C9803 HOPD COVID-19 SPEC COLLECT: HCPCS

## 2022-02-28 PROCEDURE — U0005 INFEC AGEN DETEC AMPLI PROBE: HCPCS

## 2022-02-28 PROCEDURE — 83036 HEMOGLOBIN GLYCOSYLATED A1C: CPT

## 2022-02-28 PROCEDURE — U0004 COV-19 TEST NON-CDC HGH THRU: HCPCS

## 2022-02-28 PROCEDURE — 93005 ELECTROCARDIOGRAM TRACING: CPT

## 2022-02-28 PROCEDURE — 93010 ELECTROCARDIOGRAM REPORT: CPT | Performed by: INTERNAL MEDICINE

## 2022-02-28 PROCEDURE — 85610 PROTHROMBIN TIME: CPT

## 2022-02-28 PROCEDURE — 36415 COLL VENOUS BLD VENIPUNCTURE: CPT

## 2022-02-28 PROCEDURE — 85730 THROMBOPLASTIN TIME PARTIAL: CPT

## 2022-02-28 PROCEDURE — 85027 COMPLETE CBC AUTOMATED: CPT

## 2022-02-28 PROCEDURE — 80048 BASIC METABOLIC PNL TOTAL CA: CPT

## 2022-02-28 RX ORDER — CYCLOBENZAPRINE HCL 10 MG
10 TABLET ORAL 2 TIMES DAILY PRN
COMMUNITY

## 2022-03-02 ENCOUNTER — ANESTHESIA EVENT (OUTPATIENT)
Dept: PERIOP | Facility: HOSPITAL | Age: 62
End: 2022-03-02

## 2022-03-02 ENCOUNTER — APPOINTMENT (OUTPATIENT)
Dept: GENERAL RADIOLOGY | Facility: HOSPITAL | Age: 62
End: 2022-03-02

## 2022-03-02 ENCOUNTER — ANESTHESIA (OUTPATIENT)
Dept: PERIOP | Facility: HOSPITAL | Age: 62
End: 2022-03-02

## 2022-03-02 ENCOUNTER — HOSPITAL ENCOUNTER (OUTPATIENT)
Facility: HOSPITAL | Age: 62
Discharge: HOME | End: 2022-03-03
Attending: THORACIC SURGERY (CARDIOTHORACIC VASCULAR SURGERY) | Admitting: THORACIC SURGERY (CARDIOTHORACIC VASCULAR SURGERY)

## 2022-03-02 DIAGNOSIS — I73.9 PVD (PERIPHERAL VASCULAR DISEASE): ICD-10-CM

## 2022-03-02 LAB
GLUCOSE BLDC GLUCOMTR-MCNC: 135 MG/DL (ref 70–130)
GLUCOSE BLDC GLUCOMTR-MCNC: 146 MG/DL (ref 70–130)
GLUCOSE BLDC GLUCOMTR-MCNC: 161 MG/DL (ref 70–130)
GLUCOSE BLDC GLUCOMTR-MCNC: 99 MG/DL (ref 70–130)

## 2022-03-02 PROCEDURE — C1894 INTRO/SHEATH, NON-LASER: HCPCS | Performed by: THORACIC SURGERY (CARDIOTHORACIC VASCULAR SURGERY)

## 2022-03-02 PROCEDURE — 36200 PLACE CATHETER IN AORTA: CPT | Performed by: THORACIC SURGERY (CARDIOTHORACIC VASCULAR SURGERY)

## 2022-03-02 PROCEDURE — 75630 X-RAY AORTA LEG ARTERIES: CPT

## 2022-03-02 PROCEDURE — C1725 CATH, TRANSLUMIN NON-LASER: HCPCS | Performed by: THORACIC SURGERY (CARDIOTHORACIC VASCULAR SURGERY)

## 2022-03-02 PROCEDURE — A9270 NON-COVERED ITEM OR SERVICE: HCPCS | Performed by: PHYSICIAN ASSISTANT

## 2022-03-02 PROCEDURE — 0 IODIXANOL PER 1 ML: Performed by: THORACIC SURGERY (CARDIOTHORACIC VASCULAR SURGERY)

## 2022-03-02 PROCEDURE — C1876 STENT, NON-COA/NON-COV W/DEL: HCPCS | Performed by: THORACIC SURGERY (CARDIOTHORACIC VASCULAR SURGERY)

## 2022-03-02 PROCEDURE — C1769 GUIDE WIRE: HCPCS | Performed by: THORACIC SURGERY (CARDIOTHORACIC VASCULAR SURGERY)

## 2022-03-02 PROCEDURE — 25010000002 ONDANSETRON PER 1 MG: Performed by: NURSE ANESTHETIST, CERTIFIED REGISTERED

## 2022-03-02 PROCEDURE — 63710000001 GABAPENTIN 300 MG CAPSULE: Performed by: THORACIC SURGERY (CARDIOTHORACIC VASCULAR SURGERY)

## 2022-03-02 PROCEDURE — 25010000002 HEPARIN (PORCINE) PER 1000 UNITS: Performed by: THORACIC SURGERY (CARDIOTHORACIC VASCULAR SURGERY)

## 2022-03-02 PROCEDURE — 25010000002 PROTAMINE SULFATE PER 10 MG: Performed by: NURSE ANESTHETIST, CERTIFIED REGISTERED

## 2022-03-02 PROCEDURE — 82962 GLUCOSE BLOOD TEST: CPT

## 2022-03-02 PROCEDURE — C1887 CATHETER, GUIDING: HCPCS | Performed by: THORACIC SURGERY (CARDIOTHORACIC VASCULAR SURGERY)

## 2022-03-02 PROCEDURE — 25010000002 HEPARIN (PORCINE): Performed by: NURSE ANESTHETIST, CERTIFIED REGISTERED

## 2022-03-02 PROCEDURE — 37221 PR REVSC OPN/PRQ ILIAC ART W/STNT PLMT & ANGIOPLSTY: CPT | Performed by: THORACIC SURGERY (CARDIOTHORACIC VASCULAR SURGERY)

## 2022-03-02 PROCEDURE — 75625 CONTRAST EXAM ABDOMINL AORTA: CPT | Performed by: THORACIC SURGERY (CARDIOTHORACIC VASCULAR SURGERY)

## 2022-03-02 PROCEDURE — G0378 HOSPITAL OBSERVATION PER HR: HCPCS

## 2022-03-02 PROCEDURE — 25010000002 PROPOFOL 10 MG/ML EMULSION: Performed by: NURSE ANESTHETIST, CERTIFIED REGISTERED

## 2022-03-02 PROCEDURE — 63710000001 ATORVASTATIN 40 MG TABLET: Performed by: PHYSICIAN ASSISTANT

## 2022-03-02 PROCEDURE — 63710000001 LISINOPRIL 20 MG TABLET: Performed by: PHYSICIAN ASSISTANT

## 2022-03-02 PROCEDURE — A9270 NON-COVERED ITEM OR SERVICE: HCPCS | Performed by: THORACIC SURGERY (CARDIOTHORACIC VASCULAR SURGERY)

## 2022-03-02 PROCEDURE — 75716 ARTERY X-RAYS ARMS/LEGS: CPT | Performed by: THORACIC SURGERY (CARDIOTHORACIC VASCULAR SURGERY)

## 2022-03-02 PROCEDURE — 0 LIDOCAINE 1 % SOLUTION: Performed by: THORACIC SURGERY (CARDIOTHORACIC VASCULAR SURGERY)

## 2022-03-02 DEVICE — STNT BIL VISIPRO .035 8F27MM 80CM: Type: IMPLANTABLE DEVICE | Site: ARTERY ILIAC | Status: FUNCTIONAL

## 2022-03-02 DEVICE — STNT BIL VISIPRO .035 8F27MM 135CM: Type: IMPLANTABLE DEVICE | Site: ARTERY ILIAC | Status: FUNCTIONAL

## 2022-03-02 RX ORDER — NICARDIPINE HYDROCHLORIDE 2.5 MG/ML
INJECTION INTRAVENOUS
Status: DISPENSED
Start: 2022-03-02 | End: 2022-03-03

## 2022-03-02 RX ORDER — ONDANSETRON 2 MG/ML
INJECTION INTRAMUSCULAR; INTRAVENOUS AS NEEDED
Status: DISCONTINUED | OUTPATIENT
Start: 2022-03-02 | End: 2022-03-02 | Stop reason: SURG

## 2022-03-02 RX ORDER — EPHEDRINE SULFATE 50 MG/ML
INJECTION, SOLUTION INTRAVENOUS AS NEEDED
Status: DISCONTINUED | OUTPATIENT
Start: 2022-03-02 | End: 2022-03-02 | Stop reason: SURG

## 2022-03-02 RX ORDER — MIDAZOLAM HYDROCHLORIDE 1 MG/ML
1 INJECTION INTRAMUSCULAR; INTRAVENOUS
Status: DISCONTINUED | OUTPATIENT
Start: 2022-03-02 | End: 2022-03-02 | Stop reason: HOSPADM

## 2022-03-02 RX ORDER — ASPIRIN 81 MG/1
81 TABLET ORAL DAILY
Status: DISCONTINUED | OUTPATIENT
Start: 2022-03-02 | End: 2022-03-03 | Stop reason: HOSPADM

## 2022-03-02 RX ORDER — LIDOCAINE HYDROCHLORIDE 10 MG/ML
INJECTION, SOLUTION INFILTRATION; PERINEURAL AS NEEDED
Status: DISCONTINUED | OUTPATIENT
Start: 2022-03-02 | End: 2022-03-02 | Stop reason: HOSPADM

## 2022-03-02 RX ORDER — NICOTINE POLACRILEX 4 MG
15 LOZENGE BUCCAL
Status: DISCONTINUED | OUTPATIENT
Start: 2022-03-02 | End: 2022-03-03 | Stop reason: HOSPADM

## 2022-03-02 RX ORDER — LISINOPRIL 20 MG/1
20 TABLET ORAL DAILY
Status: DISCONTINUED | OUTPATIENT
Start: 2022-03-02 | End: 2022-03-03 | Stop reason: HOSPADM

## 2022-03-02 RX ORDER — PROTAMINE SULFATE 10 MG/ML
INJECTION, SOLUTION INTRAVENOUS AS NEEDED
Status: DISCONTINUED | OUTPATIENT
Start: 2022-03-02 | End: 2022-03-02 | Stop reason: SURG

## 2022-03-02 RX ORDER — ATORVASTATIN CALCIUM 40 MG/1
80 TABLET, FILM COATED ORAL DAILY
Status: DISCONTINUED | OUTPATIENT
Start: 2022-03-02 | End: 2022-03-03 | Stop reason: HOSPADM

## 2022-03-02 RX ORDER — RANOLAZINE 500 MG/1
500 TABLET, EXTENDED RELEASE ORAL DAILY
Status: DISCONTINUED | OUTPATIENT
Start: 2022-03-03 | End: 2022-03-03 | Stop reason: HOSPADM

## 2022-03-02 RX ORDER — LIDOCAINE HYDROCHLORIDE 10 MG/ML
INJECTION, SOLUTION EPIDURAL; INFILTRATION; INTRACAUDAL; PERINEURAL AS NEEDED
Status: DISCONTINUED | OUTPATIENT
Start: 2022-03-02 | End: 2022-03-02 | Stop reason: SURG

## 2022-03-02 RX ORDER — IODIXANOL 320 MG/ML
INJECTION, SOLUTION INTRAVASCULAR AS NEEDED
Status: DISCONTINUED | OUTPATIENT
Start: 2022-03-02 | End: 2022-03-02 | Stop reason: HOSPADM

## 2022-03-02 RX ORDER — SODIUM CHLORIDE 450 MG/100ML
100 INJECTION, SOLUTION INTRAVENOUS CONTINUOUS
Status: DISCONTINUED | OUTPATIENT
Start: 2022-03-02 | End: 2022-03-03 | Stop reason: HOSPADM

## 2022-03-02 RX ORDER — FERROUS SULFATE 325(65) MG
325 TABLET ORAL
Status: DISCONTINUED | OUTPATIENT
Start: 2022-03-03 | End: 2022-03-03 | Stop reason: HOSPADM

## 2022-03-02 RX ORDER — SODIUM CHLORIDE 0.9 % (FLUSH) 0.9 %
10 SYRINGE (ML) INJECTION EVERY 12 HOURS SCHEDULED
Status: DISCONTINUED | OUTPATIENT
Start: 2022-03-02 | End: 2022-03-02 | Stop reason: HOSPADM

## 2022-03-02 RX ORDER — CLOPIDOGREL BISULFATE 75 MG/1
75 TABLET ORAL DAILY
Status: DISCONTINUED | OUTPATIENT
Start: 2022-03-03 | End: 2022-03-03 | Stop reason: HOSPADM

## 2022-03-02 RX ORDER — ISOSORBIDE MONONITRATE 120 MG/1
120 TABLET, EXTENDED RELEASE ORAL DAILY
Status: DISCONTINUED | OUTPATIENT
Start: 2022-03-03 | End: 2022-03-03 | Stop reason: HOSPADM

## 2022-03-02 RX ORDER — SODIUM CHLORIDE, SODIUM LACTATE, POTASSIUM CHLORIDE, CALCIUM CHLORIDE 600; 310; 30; 20 MG/100ML; MG/100ML; MG/100ML; MG/100ML
9 INJECTION, SOLUTION INTRAVENOUS CONTINUOUS
Status: DISCONTINUED | OUTPATIENT
Start: 2022-03-02 | End: 2022-03-03 | Stop reason: HOSPADM

## 2022-03-02 RX ORDER — FENTANYL CITRATE 50 UG/ML
50 INJECTION, SOLUTION INTRAMUSCULAR; INTRAVENOUS
Status: DISCONTINUED | OUTPATIENT
Start: 2022-03-02 | End: 2022-03-02 | Stop reason: HOSPADM

## 2022-03-02 RX ORDER — FAMOTIDINE 10 MG/ML
20 INJECTION, SOLUTION INTRAVENOUS ONCE
Status: DISCONTINUED | OUTPATIENT
Start: 2022-03-02 | End: 2022-03-02 | Stop reason: HOSPADM

## 2022-03-02 RX ORDER — PROPOFOL 10 MG/ML
VIAL (ML) INTRAVENOUS AS NEEDED
Status: DISCONTINUED | OUTPATIENT
Start: 2022-03-02 | End: 2022-03-02 | Stop reason: SURG

## 2022-03-02 RX ORDER — CYCLOBENZAPRINE HCL 10 MG
10 TABLET ORAL 2 TIMES DAILY PRN
Status: DISCONTINUED | OUTPATIENT
Start: 2022-03-02 | End: 2022-03-03 | Stop reason: HOSPADM

## 2022-03-02 RX ORDER — MORPHINE SULFATE 4 MG/ML
2 INJECTION, SOLUTION INTRAMUSCULAR; INTRAVENOUS
Status: DISCONTINUED | OUTPATIENT
Start: 2022-03-02 | End: 2022-03-03 | Stop reason: HOSPADM

## 2022-03-02 RX ORDER — FAMOTIDINE 20 MG/1
20 TABLET, FILM COATED ORAL ONCE
Status: COMPLETED | OUTPATIENT
Start: 2022-03-02 | End: 2022-03-02

## 2022-03-02 RX ORDER — HYDROMORPHONE HYDROCHLORIDE 1 MG/ML
0.5 INJECTION, SOLUTION INTRAMUSCULAR; INTRAVENOUS; SUBCUTANEOUS
Status: DISCONTINUED | OUTPATIENT
Start: 2022-03-02 | End: 2022-03-02 | Stop reason: HOSPADM

## 2022-03-02 RX ORDER — SODIUM CHLORIDE 0.9 % (FLUSH) 0.9 %
10 SYRINGE (ML) INJECTION AS NEEDED
Status: DISCONTINUED | OUTPATIENT
Start: 2022-03-02 | End: 2022-03-02 | Stop reason: HOSPADM

## 2022-03-02 RX ORDER — DEXTROSE MONOHYDRATE 25 G/50ML
25 INJECTION, SOLUTION INTRAVENOUS
Status: DISCONTINUED | OUTPATIENT
Start: 2022-03-02 | End: 2022-03-03 | Stop reason: HOSPADM

## 2022-03-02 RX ORDER — ONDANSETRON 2 MG/ML
4 INJECTION INTRAMUSCULAR; INTRAVENOUS ONCE AS NEEDED
Status: DISCONTINUED | OUTPATIENT
Start: 2022-03-02 | End: 2022-03-02 | Stop reason: HOSPADM

## 2022-03-02 RX ORDER — GABAPENTIN 300 MG/1
300 CAPSULE ORAL 3 TIMES DAILY
Status: DISCONTINUED | OUTPATIENT
Start: 2022-03-02 | End: 2022-03-03 | Stop reason: HOSPADM

## 2022-03-02 RX ORDER — LIDOCAINE HYDROCHLORIDE 10 MG/ML
0.5 INJECTION, SOLUTION EPIDURAL; INFILTRATION; INTRACAUDAL; PERINEURAL ONCE AS NEEDED
Status: COMPLETED | OUTPATIENT
Start: 2022-03-02 | End: 2022-03-02

## 2022-03-02 RX ADMIN — ATORVASTATIN CALCIUM 80 MG: 40 TABLET, FILM COATED ORAL at 18:22

## 2022-03-02 RX ADMIN — SODIUM CHLORIDE 100 ML/HR: 4.5 INJECTION, SOLUTION INTRAVENOUS at 18:23

## 2022-03-02 RX ADMIN — PROTAMINE SULFATE 50 MG: 10 INJECTION, SOLUTION INTRAVENOUS at 14:03

## 2022-03-02 RX ADMIN — PROPOFOL 130 MG: 10 INJECTION, EMULSION INTRAVENOUS at 12:44

## 2022-03-02 RX ADMIN — LIDOCAINE HYDROCHLORIDE 50 MG: 10 INJECTION, SOLUTION EPIDURAL; INFILTRATION; INTRACAUDAL; PERINEURAL at 12:44

## 2022-03-02 RX ADMIN — LIDOCAINE HYDROCHLORIDE 0.5 ML: 10 INJECTION, SOLUTION EPIDURAL; INFILTRATION; INTRACAUDAL; PERINEURAL at 11:40

## 2022-03-02 RX ADMIN — EPHEDRINE SULFATE 5 MG: 50 INJECTION INTRAVENOUS at 12:58

## 2022-03-02 RX ADMIN — NICARDIPINE HYDROCHLORIDE 10 MG/HR: 25 INJECTION, SOLUTION INTRAVENOUS at 14:35

## 2022-03-02 RX ADMIN — LISINOPRIL 20 MG: 20 TABLET ORAL at 18:22

## 2022-03-02 RX ADMIN — GABAPENTIN 300 MG: 300 CAPSULE ORAL at 19:37

## 2022-03-02 RX ADMIN — NICARDIPINE HYDROCHLORIDE 5 MG/HR: 25 INJECTION, SOLUTION INTRAVENOUS at 19:36

## 2022-03-02 RX ADMIN — SODIUM CHLORIDE, POTASSIUM CHLORIDE, SODIUM LACTATE AND CALCIUM CHLORIDE 9 ML/HR: 600; 310; 30; 20 INJECTION, SOLUTION INTRAVENOUS at 11:40

## 2022-03-02 RX ADMIN — FAMOTIDINE 20 MG: 20 TABLET ORAL at 11:59

## 2022-03-02 RX ADMIN — ONDANSETRON 4 MG: 2 INJECTION INTRAMUSCULAR; INTRAVENOUS at 14:03

## 2022-03-02 RX ADMIN — HEPARIN SODIUM 3000 ML: 200 INJECTION, SOLUTION INTRAVENOUS at 13:22

## 2022-03-02 NOTE — OP NOTE
Operative Report  Paula Valencia  [id number]  1960    Preop Diagnosis: Atherosclerotic peripheral vascular disease, bilateral claudication        Postop Diagnosis same        Procedure: #1 aortogram with runoff to the ankles #2 left iliac PTA with a 4 x 4 balloon #3 right common iliac stent with a 8 x 27 VISI pro balloon #4 left common iliac stent with a 8 x 27 VISI pro balloon using a kissing balloon technique #5 completion aortogram        Surgeons: Beau Vasques        Assistant: Cliff Goff  Assistant: Kacey Goff PA-C was responsible for performing the following activities: Retraction, Suction, Closing and Placing Dressing and their skilled assistance was necessary for the success of this case.       Operative Findings: Greater than 90% bilateral iliac artery stenosis, 60% infrarenal aortic occlusion        Description: Patient was brought to the operating room placed under general anesthesia.  The left groin was stuck using a modified Seldinger technique and a 4 [place] sheath was inserted.  At this time using an angle Glidewire and a angled glide cath was able to navigate the iliac and placed a pigtail catheter at L1 and pullback for L3 for an aortogram with runoff.  Operative findings the patient had approximately 60% infrarenal abdominal aortic stenosis.  Both right and left common iliacs had about 90% or greater stenosis at the takeoff.  The remaining portion of the iliac and internal iliacs revealed no occlusive disease.  The right and left femoral profunda superficial femoral popliteal and three-vessel runoff bilaterally were essentially normal.  At this time is obvious the iliacs were the main problem.  The right groin was stuck using modified Seldinger technique 6 [place] sheath was inserted.  The patient was given 3000 units of heparin.  At this time the left iliac was exchanged for a 6 Pakistani sheath.  We were able to get advantage wire is across both iliacs.  At this time a left  common iliac PTA with a 4 x 4 balloon was then done to 10 ivania of pressure.  At this time the 8 x 27 stents were placed in a kissing stent technique.  They were both deployed to 12 ivania of pressure.  The balloons were removed pigtail catheter is placed again and completion angiogram revealed that the greater 90% stenosis had been reduced to 0% stenosis with excellent flow.  The sheaths were removed and pressure was held the patient tolerated procedure without difficulty.  Contrast was 100 cc of VISI Paek, fluoroscopy time 13 minutes, radiation dose 288 mGy.        EBL: Less than 100 cc      Please note that portions of this note were completed with a voice recognition program. Efforts were made to edit the dictations, but words may be mistranscribed      Beau Vasques MD  03/02/22 13:43 EST

## 2022-03-02 NOTE — ANESTHESIA PREPROCEDURE EVALUATION
Anesthesia Evaluation                  Airway   Mallampati: I  TM distance: >3 FB  Neck ROM: full  No difficulty expected  Dental      Pulmonary    (+) shortness of breath, sleep apnea,   Cardiovascular     ECG reviewed    (+) hypertension, CAD, CABG, PVD,       Neuro/Psych  (+) psychiatric history Anxiety and Depression,    GI/Hepatic/Renal/Endo    (+)  GERD,  renal disease, diabetes mellitus,     Musculoskeletal     Abdominal    Substance History      OB/GYN          Other   arthritis,                      Anesthesia Plan    ASA 3     general     intravenous induction     Anesthetic plan, all risks, benefits, and alternatives have been provided, discussed and informed consent has been obtained with: patient.    Plan discussed with CRNA.        CODE STATUS:

## 2022-03-02 NOTE — H&P
"Pre-Op H&P  Paula Valencia  [id number]  1960    Chief complaint: Peripheral vascular disease    HPI:    Patient is a 62 y.o.female who presents with a history bilateral lower extremity intermittent claudication symptoms, R>L.  She reports difficulty walking due to pain.  The pain is in her BLE and upper thighs.  She experiences rest pain in her BLE posterior calves while sleeping that she describes as \"mejia horses.\"  She has no previous foot wounds/ulcers or delayed wound healing. Surgical intervention is recommended and she is agreeable. She is here today for an abdominal aortogram with/without runoffs, possible stent(s).    Review of Systems:  General ROS: negative for chills, fever or skin lesions;  No changes since last office visit.  Neg for recent sick exposure  Cardiovascular ROS: no chest pain; +basline dyspnea on exertion, +HTN, +HLD, +CAD- s/p CABG 2013, +PVD- s/p aortogram  w/runoff 6/16/21  Respiratory ROS: no cough, shortness of breath, or wheezing; current everyday cigarette smoker (1/2 ppd x 40 years), +ADRIANA  Endocrine ROS: +IDDM (HgbA1c 8.8)  Renal ROS: +CKD    Allergies: No Known Allergies    Home Meds:    No current facility-administered medications on file prior to encounter.     Current Outpatient Medications on File Prior to Encounter   Medication Sig Dispense Refill   • ALPRAZolam (XANAX) 0.5 MG tablet Take 0.5 mg by mouth 2 (Two) Times a Day. for anxiety     • aspirin 81 MG EC tablet Take 81 mg by mouth Daily.     • atorvastatin (LIPITOR) 80 MG tablet Take 80 mg by mouth Daily.     • clopidogrel (PLAVIX) 75 MG tablet Take 75 mg by mouth Daily. Coronary stent x 1     • ferrous sulfate 325 (65 FE) MG tablet Take 325 mg by mouth Daily With Breakfast.     • gabapentin (NEURONTIN) 300 MG capsule Take 300 mg by mouth 3 (Three) Times a Day.     • insulin glargine (LANTUS, SEMGLEE) 100 UNIT/ML injection Inject 15 Units under the skin into the appropriate area as directed Daily. For blood " glucose greater than or equal to 400     • insulin NPH-insulin regular (humuLIN 70/30,novoLIN 70/30) (70-30) 100 UNIT/ML injection Inject  under the skin into the appropriate area as directed As Needed.     • isosorbide mononitrate (IMDUR) 120 MG 24 hr tablet Take 120 mg by mouth Daily.     • lisinopril (PRINIVIL,ZESTRIL) 20 MG tablet Take 20 mg by mouth Daily.     • metFORMIN (GLUCOPHAGE) 500 MG tablet Take 1,000 mg by mouth 2 (Two) Times a Day With Meals.     • metoprolol succinate XL (TOPROL-XL) 50 MG 24 hr tablet Take 75 mg by mouth Daily.     • nitroglycerin (NITROLINGUAL) 0.4 MG/SPRAY spray Place 1 spray under the tongue As Needed.     • ranolazine (RANEXA) 500 MG 12 hr tablet Take 500 mg by mouth Daily.     • VITAMIN D PO Take  by mouth.         PMH:   Past Medical History:   Diagnosis Date   • Anxiety    • Arthritis    • Bilateral leg cramps    • Coronary artery disease    • Diabetes mellitus (HCC)    • GERD (gastroesophageal reflux disease)    • History of transfusion 1995    no reaction   • Hyperlipidemia    • Hypertension    • Peripheral vascular disease (HCC)    • Renal cyst     bilateral   • Sleep apnea     c-pap not in use since 02/2021   • SOB (shortness of breath)    • Wears glasses      PSH:    Past Surgical History:   Procedure Laterality Date   • AORTAGRAM N/A 6/16/2021    Procedure: ABDOMINAL AORTAGRAM;  Surgeon: Beau Vasques MD;  Location: [facility];  Service: Vascular;  Laterality: N/A;  FLUORO - 9 MIN   DOISE- 62MGY  40 ML VISIPAQUE 320     • BACK SURGERY     • CARDIAC CATHETERIZATION     • CARDIAC CATHETERIZATION Left 8/5/2021    Procedure: Left Heart Cath;  Surgeon: Beau Frazier MD;  Location: [facility] CATH INVASIVE LOCATION;  Service: Cardiovascular;  Laterality: Left;   • CHOLECYSTECTOMY     • COLONOSCOPY  2019   • CORONARY ARTERY BYPASS GRAFT      2013 approx at [facility] x 3    • CORONARY STENT PLACEMENT      after CABG    • ENDOSCOPY     • HYSTERECTOMY     • INGUINAL HERNIA  REPAIR Right    • LAPAROSCOPIC OVARIAN CYSTECTOMY         Social History:   Tobacco:   Social History     Tobacco Use   Smoking Status Current Some Day Smoker   • Packs/day: 0.50   • Years: 40.00   • Pack years: 20.00   • Types: Cigarettes   • Start date: 1981   Smokeless Tobacco Never Used      Alcohol:     Social History     Substance and Sexual Activity   Alcohol Use Not Currently       Physical Exam:  General Appearance:    Alert, cooperative, no distress, appears stated age   Head:    Normocephalic, without obvious abnormality, atraumatic   Lungs:     Clear to auscultation bilaterally, diminished lung sounds bilaterally, respirations unlabored    Heart:   Regular rate and rhythm, S1 and S2 normal, no murmur, rub    or gallop    Abdomen:    Soft, non-tender, +bowel sounds   Breast Exam:    deferred   Genitalia:    deferred   Extremities:   Extremities normal, atraumatic, no cyanosis or edema   Skin:   Skin color, texture, turgor normal, no rashes or lesions   Neurologic:   Grossly intact   Results Review  LABS:  Lab Results   Component Value Date    WBC 8.34 02/28/2022    HGB 14.6 02/28/2022    HCT 43.9 02/28/2022    MCV 93.6 02/28/2022     02/28/2022    NEUTROABS 4.70 07/29/2015    GLUCOSE 191 (H) 02/28/2022    BUN 11 02/28/2022    CREATININE 1.04 (H) 02/28/2022    EGFRIFNONA 57 (L) 08/06/2021     02/28/2022    K 4.5 02/28/2022     02/28/2022    CO2 28.0 02/28/2022    CALCIUM 9.6 02/28/2022    ALBUMIN 4.10 08/05/2021    AST 15 08/05/2021    ALT 7 08/05/2021    BILITOT 0.5 08/05/2021    PTT 27.1 02/28/2022    INR 0.98 02/28/2022       I reviewed the patient's new clinical results.    Cancer Staging (if applicable)  Cancer Patient: __ yes _X_no __unknown; If yes, clinical stage T:__ N:__M:__, stage group or __N/A    Impression: Peripheral vascular disease     Plan: Abdominal aortogram with/without runoffs, possible stent(s)      EARLENE Perez   03/02/22   11:35 AM EST

## 2022-03-02 NOTE — ANESTHESIA PROCEDURE NOTES
Airway  Urgency: elective    Date/Time: 3/2/2022 12:45 PM  Airway not difficult    General Information and Staff    Patient location during procedure: OR  Anesthesiologist: Bruce Rojas MD  CRNA: Flakito Pierre CRNA    Indications and Patient Condition  Indications for airway management: airway protection    Preoxygenated: yes  Mask difficulty assessment: 1 - vent by mask    Final Airway Details  Final airway type: supraglottic airway      Successful airway: I-gel  Size 4    Number of attempts at approach: 1  Assessment: lips, teeth, and gum same as pre-op    Additional Comments  LMA placed without difficulty, ventilation with assist, equal breath sounds and symmetric chest rise and fall

## 2022-03-02 NOTE — PLAN OF CARE
Goal Outcome Evaluation:  Plan of Care Reviewed With: patient      Patient transferred from PACU to floor. No complaints of pain. Bilateral groin site dressings are clean, dry, intact, and soft around sites. Sandbags in place upon arrival, removed at 1830. Bedrest till 2020. Patient is alert & oriented. VSS on room air. No other concerns at this time. Will continue to monitor.

## 2022-03-02 NOTE — ANESTHESIA POSTPROCEDURE EVALUATION
Patient: Paula Valencia    Procedure Summary     Date: 03/02/22 Room / Location:  [facility] /  [facility]    Anesthesia Start: 1241 Anesthesia Stop: 1425    Procedure: AORTAGRAM WITH RUNOFFS, LEFT COMMON ILIAC ANGIOPLASTY AND BILATERAL COMMON ILIAC STENT PLACEMENT FLUORO: 13 MIN DOSE: 288 MGY CONTRAST: 100 ML VISI (N/A ) Diagnosis:       PVD (peripheral vascular disease) (HCC)      (PVD (peripheral vascular disease) (HCC) [I73.9])    Surgeons: Beau Vasques MD Provider: Bruce Rojas MD    Anesthesia Type: general ASA Status: 3          Anesthesia Type: general    Vitals  Vitals Value Taken Time   /95 03/02/22 1419   Temp     Pulse 72 03/02/22 1424   Resp     SpO2     Vitals shown include unvalidated device data.        Post Anesthesia Care and Evaluation    Patient location during evaluation: PACU  Patient participation: complete - patient participated  Level of consciousness: awake and alert  Pain management: adequate  Airway patency: patent  Anesthetic complications: No anesthetic complications  PONV Status: none  Cardiovascular status: hemodynamically stable and acceptable  Respiratory status: nonlabored ventilation, acceptable and nasal cannula  Hydration status: acceptable

## 2022-03-03 VITALS
HEIGHT: 63 IN | RESPIRATION RATE: 18 BRPM | TEMPERATURE: 98.2 F | SYSTOLIC BLOOD PRESSURE: 128 MMHG | WEIGHT: 129 LBS | BODY MASS INDEX: 22.86 KG/M2 | DIASTOLIC BLOOD PRESSURE: 66 MMHG | OXYGEN SATURATION: 94 % | HEART RATE: 69 BPM

## 2022-03-03 LAB
ANION GAP SERPL CALCULATED.3IONS-SCNC: 9 MMOL/L (ref 5–15)
BASOPHILS # BLD AUTO: 0.06 10*3/MM3 (ref 0–0.2)
BASOPHILS NFR BLD AUTO: 0.6 % (ref 0–1.5)
BUN SERPL-MCNC: 11 MG/DL (ref 8–23)
BUN/CREAT SERPL: 11.7 (ref 7–25)
CALCIUM SPEC-SCNC: 8.6 MG/DL (ref 8.6–10.5)
CHLORIDE SERPL-SCNC: 98 MMOL/L (ref 98–107)
CO2 SERPL-SCNC: 28 MMOL/L (ref 22–29)
CREAT SERPL-MCNC: 0.94 MG/DL (ref 0.57–1)
DEPRECATED RDW RBC AUTO: 44.9 FL (ref 37–54)
EGFRCR SERPLBLD CKD-EPI 2021: 68.7 ML/MIN/1.73
EOSINOPHIL # BLD AUTO: 0.2 10*3/MM3 (ref 0–0.4)
EOSINOPHIL NFR BLD AUTO: 2.1 % (ref 0.3–6.2)
ERYTHROCYTE [DISTWIDTH] IN BLOOD BY AUTOMATED COUNT: 12.6 % (ref 12.3–15.4)
GLUCOSE BLDC GLUCOMTR-MCNC: 136 MG/DL (ref 70–130)
GLUCOSE SERPL-MCNC: 162 MG/DL (ref 65–99)
HCT VFR BLD AUTO: 38.8 % (ref 34–46.6)
HGB BLD-MCNC: 12.5 G/DL (ref 12–15.9)
IMM GRANULOCYTES # BLD AUTO: 0.05 10*3/MM3 (ref 0–0.05)
IMM GRANULOCYTES NFR BLD AUTO: 0.5 % (ref 0–0.5)
LYMPHOCYTES # BLD AUTO: 1.38 10*3/MM3 (ref 0.7–3.1)
LYMPHOCYTES NFR BLD AUTO: 14.4 % (ref 19.6–45.3)
MCH RBC QN AUTO: 31.3 PG (ref 26.6–33)
MCHC RBC AUTO-ENTMCNC: 32.2 G/DL (ref 31.5–35.7)
MCV RBC AUTO: 97 FL (ref 79–97)
MONOCYTES # BLD AUTO: 0.8 10*3/MM3 (ref 0.1–0.9)
MONOCYTES NFR BLD AUTO: 8.4 % (ref 5–12)
NEUTROPHILS NFR BLD AUTO: 7.08 10*3/MM3 (ref 1.7–7)
NEUTROPHILS NFR BLD AUTO: 74 % (ref 42.7–76)
NRBC BLD AUTO-RTO: 0 /100 WBC (ref 0–0.2)
PLATELET # BLD AUTO: 174 10*3/MM3 (ref 140–450)
PMV BLD AUTO: 11.3 FL (ref 6–12)
POTASSIUM SERPL-SCNC: 3.9 MMOL/L (ref 3.5–5.2)
RBC # BLD AUTO: 4 10*6/MM3 (ref 3.77–5.28)
SODIUM SERPL-SCNC: 135 MMOL/L (ref 136–145)
WBC NRBC COR # BLD: 9.57 10*3/MM3 (ref 3.4–10.8)

## 2022-03-03 PROCEDURE — 63710000001 METOPROLOL SUCCINATE XL 50 MG TABLET SUSTAINED-RELEASE 24 HOUR: Performed by: PHYSICIAN ASSISTANT

## 2022-03-03 PROCEDURE — A9270 NON-COVERED ITEM OR SERVICE: HCPCS | Performed by: PHYSICIAN ASSISTANT

## 2022-03-03 PROCEDURE — 63710000001 CLOPIDOGREL 75 MG TABLET: Performed by: PHYSICIAN ASSISTANT

## 2022-03-03 PROCEDURE — 63710000001 ASPIRIN 81 MG TABLET DELAYED-RELEASE: Performed by: PHYSICIAN ASSISTANT

## 2022-03-03 PROCEDURE — 80048 BASIC METABOLIC PNL TOTAL CA: CPT | Performed by: PHYSICIAN ASSISTANT

## 2022-03-03 PROCEDURE — 99212 OFFICE O/P EST SF 10 MIN: CPT | Performed by: THORACIC SURGERY (CARDIOTHORACIC VASCULAR SURGERY)

## 2022-03-03 PROCEDURE — 63710000001 ISOSORBIDE MONONITRATE 120 MG TABLET SUSTAINED-RELEASE 24 HOUR: Performed by: PHYSICIAN ASSISTANT

## 2022-03-03 PROCEDURE — 63710000001 METOPROLOL SUCCINATE XL 25 MG TABLET SUSTAINED-RELEASE 24 HOUR: Performed by: PHYSICIAN ASSISTANT

## 2022-03-03 PROCEDURE — 63710000001 ATORVASTATIN 40 MG TABLET: Performed by: PHYSICIAN ASSISTANT

## 2022-03-03 PROCEDURE — 63710000001 LISINOPRIL 20 MG TABLET: Performed by: PHYSICIAN ASSISTANT

## 2022-03-03 PROCEDURE — 63710000001 CYCLOBENZAPRINE 10 MG TABLET: Performed by: THORACIC SURGERY (CARDIOTHORACIC VASCULAR SURGERY)

## 2022-03-03 PROCEDURE — A9270 NON-COVERED ITEM OR SERVICE: HCPCS | Performed by: THORACIC SURGERY (CARDIOTHORACIC VASCULAR SURGERY)

## 2022-03-03 PROCEDURE — G0378 HOSPITAL OBSERVATION PER HR: HCPCS

## 2022-03-03 PROCEDURE — 63710000001 RANOLAZINE 500 MG TABLET SUSTAINED-RELEASE 12 HOUR: Performed by: PHYSICIAN ASSISTANT

## 2022-03-03 PROCEDURE — 85025 COMPLETE CBC W/AUTO DIFF WBC: CPT | Performed by: PHYSICIAN ASSISTANT

## 2022-03-03 PROCEDURE — 82962 GLUCOSE BLOOD TEST: CPT

## 2022-03-03 PROCEDURE — 63710000001 FERROUS SULFATE 325 (65 FE) MG TABLET: Performed by: PHYSICIAN ASSISTANT

## 2022-03-03 PROCEDURE — 63710000001 GABAPENTIN 300 MG CAPSULE: Performed by: THORACIC SURGERY (CARDIOTHORACIC VASCULAR SURGERY)

## 2022-03-03 RX ADMIN — METOPROLOL SUCCINATE 75 MG: 50 TABLET, EXTENDED RELEASE ORAL at 08:03

## 2022-03-03 RX ADMIN — FERROUS SULFATE TAB 325 MG (65 MG ELEMENTAL FE) 325 MG: 325 (65 FE) TAB at 08:02

## 2022-03-03 RX ADMIN — CYCLOBENZAPRINE 10 MG: 10 TABLET, FILM COATED ORAL at 00:04

## 2022-03-03 RX ADMIN — LISINOPRIL 20 MG: 20 TABLET ORAL at 08:03

## 2022-03-03 RX ADMIN — ISOSORBIDE MONONITRATE 120 MG: 120 TABLET, EXTENDED RELEASE ORAL at 08:03

## 2022-03-03 RX ADMIN — ASPIRIN 81 MG: 81 TABLET, COATED ORAL at 08:02

## 2022-03-03 RX ADMIN — GABAPENTIN 300 MG: 300 CAPSULE ORAL at 08:03

## 2022-03-03 RX ADMIN — ATORVASTATIN CALCIUM 80 MG: 40 TABLET, FILM COATED ORAL at 08:02

## 2022-03-03 RX ADMIN — RANOLAZINE 500 MG: 500 TABLET, EXTENDED RELEASE ORAL at 08:03

## 2022-03-03 RX ADMIN — CLOPIDOGREL BISULFATE 75 MG: 75 TABLET ORAL at 08:03

## 2022-03-03 NOTE — CASE MANAGEMENT/SOCIAL WORK
Case Management Discharge Note      Final Note: Discharge orders noted. I called and spoke with SHASHANK Whittington and patient up ad arnaldo in room, RA and has a ride home. No d/c needs noted at this time. [facility] @ [id number]         Selected Continued Care - Admitted Since 3/2/2022     Destination    No services have been selected for the patient.              Durable Medical Equipment    No services have been selected for the patient.              Dialysis/Infusion    No services have been selected for the patient.              Home Medical Care    No services have been selected for the patient.              Therapy    No services have been selected for the patient.              Community Resources    No services have been selected for the patient.              Community & DME    No services have been selected for the patient.                       Final Discharge Disposition Code: 01 - home or self-care

## 2022-03-03 NOTE — PROGRESS NOTES
"Paula Valencia  [id number]  1960     LOS: 0 days   Patient Care Team:  Oswald Cedillo MD as PCP - General  Oswald Cedillo MD as PCP - Family Medicine  Amirah Reilly MD as Consulting Physician (Internal Medicine)    Chief Complaint: Peripheral vascular disease      Subjective: Feet feel good.  No complaint    Objective:     Vital Sign Min/Max for last 24 hours  Temp  Min: 97 °F (36.1 °C)  Max: 98.8 °F (37.1 °C)   BP  Min: 117/61  Max: 187/92   Pulse  Min: 57  Max: 72   Resp  Min: 15  Max: 18   SpO2  Min: 89 %  Max: 97 %   No data recorded   Weight  Min: 58.5 kg (129 lb)  Max: 58.5 kg (129 lb)     Flowsheet Rows      First Filed Value   Admission Height 160 cm (63\") Documented at 03/02/2022 1148   Admission Weight 58.5 kg (129 lb) Documented at 03/02/2022 1148          Physical Exam:    Wound: Pulses intact    Pulses:     Mediastinal and Chest Tube Drainage:       Results Review:   Results from last 7 days   Lab Units 03/03/22  0358   WBC 10*3/mm3 9.57   HEMOGLOBIN g/dL 12.5   HEMATOCRIT % 38.8   PLATELETS 10*3/mm3 174     Results from last 7 days   Lab Units 03/03/22  0542   SODIUM mmol/L 135*   POTASSIUM mmol/L 3.9   CHLORIDE mmol/L 98   CO2 mmol/L 28.0   BUN mg/dL 11   CREATININE mg/dL 0.94   GLUCOSE mg/dL 162*   CALCIUM mg/dL 8.6             Assessment      PVD (peripheral vascular disease) (HCC)      Peripheral vascular disease.  Discharge patient        Beau Vasques MD  03/03/22  06:59 EST      Please note that portions of this note were completed with a voice recognition program. Efforts were made to edit the dictations, but words may be mistranscribed  "

## 2022-04-04 ENCOUNTER — OFFICE VISIT (OUTPATIENT)
Dept: CARDIAC SURGERY | Facility: CLINIC | Age: 62
End: 2022-04-04

## 2022-04-04 VITALS
HEIGHT: 63 IN | OXYGEN SATURATION: 99 % | SYSTOLIC BLOOD PRESSURE: 138 MMHG | WEIGHT: 129 LBS | BODY MASS INDEX: 22.86 KG/M2 | HEART RATE: 76 BPM | DIASTOLIC BLOOD PRESSURE: 88 MMHG | TEMPERATURE: 97.8 F

## 2022-04-04 DIAGNOSIS — I73.9 PVD (PERIPHERAL VASCULAR DISEASE): ICD-10-CM

## 2022-04-04 PROCEDURE — 93923 UPR/LXTR ART STDY 3+ LVLS: CPT | Performed by: THORACIC SURGERY (CARDIOTHORACIC VASCULAR SURGERY)

## 2022-04-04 PROCEDURE — 99214 OFFICE O/P EST MOD 30 MIN: CPT | Performed by: NURSE PRACTITIONER

## 2022-04-04 NOTE — PROGRESS NOTES
[facility] Cardiothoracic Surgery Office Follow Up Note     Date of Encounter: 04/04/2022     YOB: 1960  Name: Paula Valencia    PCP: Oswald Cedillo MD    Chief Complaint:    Chief Complaint   Patient presents with   • Peripheral Vascular Disease     Hospital follow up s/p aa with runoff and bilateral iliac stent        History of Present Illness:      Paula Valencia is a 62 y.o. female with a history of hypertension, hyperlipidemia, diabetes, ongoing tobacco use, CAD s/p CABG 2013 at [facility]/stents and PVD s/p AA with runoff with left iliac PTA, right common iliac stent and left common iliac stent on 3/2/2022 with Dr. Vasques.  Patient presents today for post procedure follow-up.  Patient reports that immediately following surgery she had complete relief of her claudication symptoms.  Her bilateral thigh cramping has resolved.  She does report over the last few weeks she has developed some worsening cramping in her right calf area and left ankle area.  She reports that this usually occurs first thing in the morning and resolves as the day goes on.  She does not relate it to ambulation.  She does have a history of neuropathy.  She is on DAPT/statin therapy.  She continues to smoke.    Review of Systems:  Review of Systems   Constitutional: Negative for chills, decreased appetite, diaphoresis, fever, malaise/fatigue, night sweats and weight loss.   HENT: Negative for congestion, hoarse voice, sore throat and stridor.    Cardiovascular: Negative for chest pain, claudication, dyspnea on exertion, irregular heartbeat, leg swelling, near-syncope, orthopnea, palpitations, paroxysmal nocturnal dyspnea and syncope.   Respiratory: Negative for cough, hemoptysis, shortness of breath, sleep disturbances due to breathing, snoring, sputum production and wheezing.    Hematologic/Lymphatic: Negative for adenopathy and bleeding problem. Does not bruise/bleed easily.   Skin: Negative for color  change, dry skin, itching, poor wound healing and rash.   Musculoskeletal: Negative for arthritis, back pain, falls and muscle weakness.   Gastrointestinal: Negative for abdominal pain, anorexia, constipation, diarrhea, hematochezia, melena, nausea and vomiting.   Neurological: Negative for difficulty with concentration, disturbances in coordination, dizziness, loss of balance, numbness, seizures, vertigo and weakness.   Psychiatric/Behavioral: Negative for altered mental status, depression, memory loss and substance abuse. The patient does not have insomnia and is not nervous/anxious.    Allergic/Immunologic: Negative for persistent infections.       Allergies:  No Known Allergies    Medications:      Current Outpatient Medications:   •  ALPRAZolam (XANAX) 0.5 MG tablet, Take 0.5 mg by mouth 2 (Two) Times a Day. for anxiety, Disp: , Rfl:   •  aspirin 81 MG EC tablet, Take 81 mg by mouth Daily., Disp: , Rfl:   •  atorvastatin (LIPITOR) 80 MG tablet, Take 80 mg by mouth Daily., Disp: , Rfl:   •  clopidogrel (PLAVIX) 75 MG tablet, Take 75 mg by mouth Daily. Coronary stent x 1, Disp: , Rfl:   •  cyclobenzaprine (FLEXERIL) 10 MG tablet, Take 10 mg by mouth 2 (Two) Times a Day As Needed for Muscle Spasms., Disp: , Rfl:   •  ferrous sulfate 325 (65 FE) MG tablet, Take 325 mg by mouth Daily With Breakfast., Disp: , Rfl:   •  gabapentin (NEURONTIN) 300 MG capsule, Take 300 mg by mouth 3 (Three) Times a Day., Disp: , Rfl:   •  insulin glargine (LANTUS, SEMGLEE) 100 UNIT/ML injection, Inject 15 Units under the skin into the appropriate area as directed Daily. For blood glucose greater than or equal to 400, Disp: , Rfl:   •  insulin NPH-insulin regular (humuLIN 70/30,novoLIN 70/30) (70-30) 100 UNIT/ML injection, Inject  under the skin into the appropriate area as directed As Needed., Disp: , Rfl:   •  isosorbide mononitrate (IMDUR) 120 MG 24 hr tablet, Take 120 mg by mouth Daily., Disp: , Rfl:   •  lisinopril  (PRINIVIL,ZESTRIL) 20 MG tablet, Take 20 mg by mouth Daily., Disp: , Rfl:   •  metFORMIN (GLUCOPHAGE) 500 MG tablet, Take 1,000 mg by mouth 2 (Two) Times a Day With Meals., Disp: , Rfl:   •  metoprolol succinate XL (TOPROL-XL) 50 MG 24 hr tablet, Take 75 mg by mouth Daily., Disp: , Rfl:   •  nitroglycerin (NITROLINGUAL) 0.4 MG/SPRAY spray, Place 1 spray under the tongue As Needed., Disp: , Rfl:   •  ranolazine (RANEXA) 500 MG 12 hr tablet, Take 500 mg by mouth Daily., Disp: , Rfl:   •  VITAMIN D PO, Take  by mouth., Disp: , Rfl:     Social History     Socioeconomic History   • Marital status:    • Number of children: 2   Tobacco Use   • Smoking status: Current Some Day Smoker     Packs/day: 0.50     Years: 40.00     Pack years: 20.00     Types: Cigarettes     Start date: 1981   • Smokeless tobacco: Never Used   Vaping Use   • Vaping Use: Never used   Substance and Sexual Activity   • Alcohol use: Not Currently   • Drug use: Never   • Sexual activity: Defer       Family History   Problem Relation Age of Onset   • Heart attack Mother    • Hypertension Mother    • Emphysema Mother    • Other Father         history unknown   • No Known Problems Sister    • No Known Problems Brother    • No Known Problems Maternal Grandmother    • No Known Problems Maternal Grandfather    • No Known Problems Paternal Grandmother    • No Known Problems Paternal Grandfather        Past Medical History:   Diagnosis Date   • Anxiety    • Arthritis    • Bilateral leg cramps    • Coronary artery disease    • Diabetes mellitus (HCC)    • GERD (gastroesophageal reflux disease)    • History of transfusion 1995    no reaction   • Hyperlipidemia    • Hypertension    • Peripheral vascular disease (HCC)    • Renal cyst     bilateral   • Sleep apnea     c-pap not in use since 02/2021   • SOB (shortness of breath)    • Wears glasses        Past Surgical History:   Procedure Laterality Date   • AORTAGRAM N/A 6/16/2021    Procedure: ABDOMINAL  "AORTAGRAM;  Surgeon: Beau Vasques MD;  Location:  [facility];  Service: Vascular;  Laterality: N/A;  FLUORO - 9 MIN   DOISE- 62MGY  40 ML VISIPAQUE 320     • AORTAGRAM N/A 3/2/2022    Procedure: AORTAGRAM WITH RUNOFFS, LEFT COMMON ILIAC ANGIOPLASTY AND BILATERAL COMMON ILIAC STENT PLACEMENT;  Surgeon: Beau Vasques MD;  Location:  [facility];  Service: Vascular;  Laterality: N/A;  FLUORO: 13 MIN  DOSE: 288 MGY  CONTRAST: 100 ML VISI   • BACK SURGERY     • CARDIAC CATHETERIZATION     • CARDIAC CATHETERIZATION Left 8/5/2021    Procedure: Left Heart Cath;  Surgeon: Beau Frazier MD;  Location: [facility] CATH INVASIVE LOCATION;  Service: Cardiovascular;  Laterality: Left;   • CHOLECYSTECTOMY     • COLONOSCOPY  2019   • CORONARY ARTERY BYPASS GRAFT      2013 approx at [facility] x 3    • CORONARY STENT PLACEMENT      after CABG    • ENDOSCOPY     • HYSTERECTOMY     • INGUINAL HERNIA REPAIR Right    • LAPAROSCOPIC OVARIAN CYSTECTOMY         I have reviewed the following portions of the patient's history: allergies, current medications, past family history, past medical history, past social history, past surgical history and problem list and confirm it's accurate.    Physical Exam:  Vital Signs:    Vitals:    04/04/22 1314   BP: 138/88   BP Location: Right arm   Patient Position: Sitting   Pulse: 76   Temp: 97.8 °F (36.6 °C)   SpO2: 99%   Weight: 58.5 kg (129 lb)   Height: 160 cm (63\")     Body mass index is 22.85 kg/m².     Physical Exam  Vitals and nursing note reviewed.   Constitutional:       Appearance: Normal appearance. She is well-developed and well-groomed.   HENT:      Head: Normocephalic and atraumatic.   Cardiovascular:      Rate and Rhythm: Normal rate and regular rhythm.      Pulses:           Dorsalis pedis pulses are 1+ on the right side and 1+ on the left side.      Heart sounds: Normal heart sounds, S1 normal and S2 normal. No murmur heard.    No friction rub.   Pulmonary:      " Comments: Unlabored, Clear to auscultation bilaterally  Musculoskeletal:      Cervical back: Neck supple.      Right lower leg: No edema.      Left lower leg: No edema.   Skin:     General: Skin is warm and dry.   Neurological:      Mental Status: She is alert and oriented to person, place, and time.   Psychiatric:         Attention and Perception: Attention normal.         Mood and Affect: Mood normal.         Speech: Speech normal.         Behavior: Behavior is cooperative.         Labs/Imaging:    No radiology results for the last 30 days.     Results for orders placed during the hospital encounter of 08/05/21    Duplex Venous Upper Extremity - Left CAR    Interpretation Summary  · Normal left upper extremity venous duplex scan.    RAVINDER's in office today:  Right 0.91 and left 0.80    3/2/22 AA with runoff Dr. Vasques:  Operative findings the patient had approximately 60% infrarenal abdominal aortic stenosis.  Both right and left common iliacs had about 90% or greater stenosis at the takeoff.  The remaining portion of the iliac and internal iliacs revealed no occlusive disease.  The right and left femoral profunda superficial femoral popliteal and three-vessel runoff bilaterally were essentially normal.    Time Spent: I spent 37 minutes caring for Paula on this date of service. This time includes time spent by me in the following activities: preparing for the visit, reviewing tests, obtaining and/or reviewing a separately obtained history, performing a medically appropriate examination and/or evaluation, counseling and educating the patient/family/caregiver, ordering medications, tests, or procedures, documenting information in the medical record and care coordination.     Assessment / Plan:  Diagnoses and all orders for this visit:    1. PVD (peripheral vascular disease) ([facility])       Paula Valencia is a 62 y.o. female with a history of hypertension, hyperlipidemia, diabetes, ongoing tobacco use, CAD s/p CABG  2013 at [facility]/stents and PVD s/p AA with runoff with left iliac PTA, right common iliac stent and left common iliac stent on 3/2/2022 with Dr. Vasques.  Patient with stable bilateral groins post procedurally.  She does have some reports of cramping in her right calf and left ankle first thing in the morning and resolves quickly without relation to ambulation/rest pain.  Her preprocedure thigh cramping has resolved.  Recent AA with runoff with Dr. Vasques showing 60% infrarenal abdominal aortic stenosis with bilateral 90% iliac stenosis s/p kissing stents and otherwise essentially normal runoff.  I did obtain an RAVINDER in the office today to assess any changes since procedure with stable findings of right 0.91 and left 0.80.  Palpable 1+ bilateral DP pulses.  She denies any LE ulcerations.  Discussed with patient and , may be post procedure reperfusion pain as well as a component of her history of peripheral neuropathy.  She is on DAPT/statin and I have counseled pt on the importance of smoking cessation.  Follow-up in 2 months to assess her lower extremity symptoms or earlier as needed.    Jumana Abrams, EARLENE  [facility] Cardiothoracic Surgery

## 2022-04-19 NOTE — PROGRESS NOTES
Vinod Rao MD  Cardiothoracic Surgery  22      PERIPHERAL VASCULAR STUDY        PATIENT:  Paula Valencia  :  1960    MRN:  [id number]    REQUESTING PHYSICIAN: Jumana HENAO          INDICATION: Follow-up bilateral iliac stenting                    DESCRIPTION: Standard 4 cuff technique was utilized.  Note the blood pressure in the left brachial artery is diminished in comparison to the blood pressure in the right brachial artery.  The blood pressure at all levels in the lower extremities is decreased.  Waveforms are biphasic with decreased amplitude.  ABIs are diminished to bilateral.          RIGHT RAVINDER: PT=0.91    DP=0.78      LEFT RAVINDER: PT=0.80    DP=0.69          IMPRESSION: Decreased waveform amplitude and decreased ABIs bilaterally are consistent with moderate peripheral vascular occlusive disease note blood pressure decreased in the left arm as compared to the right arm is suggestive of possible subclavian stenosis.           Electronically signed by Vinod Rao MD, 22, 11:21 AM EDT.

## 2022-04-27 ENCOUNTER — TELEPHONE (OUTPATIENT)
Dept: CARDIAC SURGERY | Facility: CLINIC | Age: 62
End: 2022-04-27

## 2022-04-27 NOTE — TELEPHONE ENCOUNTER
Pt called complaining of lower leg pain in the right leg. Pt hd AA w/ r/o  And bilateral iliac stent . States that the pain is radiating down her leg into the front of her leg and the back of the leg. Spoke w/ Ruby HENAO who request that the pt come in to be evaluated. Valled the pt back and she agreered to be seen in tomorrows clinic.

## 2022-04-28 ENCOUNTER — OFFICE VISIT (OUTPATIENT)
Dept: CARDIAC SURGERY | Facility: CLINIC | Age: 62
End: 2022-04-28

## 2022-04-28 VITALS
SYSTOLIC BLOOD PRESSURE: 194 MMHG | TEMPERATURE: 97.9 F | DIASTOLIC BLOOD PRESSURE: 96 MMHG | HEIGHT: 63 IN | OXYGEN SATURATION: 97 % | BODY MASS INDEX: 22.86 KG/M2 | HEART RATE: 65 BPM | WEIGHT: 129 LBS

## 2022-04-28 DIAGNOSIS — I73.9 PVD (PERIPHERAL VASCULAR DISEASE): Primary | ICD-10-CM

## 2022-04-28 PROCEDURE — 99214 OFFICE O/P EST MOD 30 MIN: CPT | Performed by: NURSE PRACTITIONER

## 2022-04-28 NOTE — PROGRESS NOTES
[facility] Cardiothoracic Surgery Office Follow Up Note     Date of Encounter: 2022     Name: Paula Valencia  : 1960     Referred By: No ref. provider found  PCP: Oswald Cedillo MD    Chief Complaint:    Chief Complaint   Patient presents with   • Peripheral Vascular Disease     Follow up for right leg pain s/p bilateral iliac stents 3/2/22.       Subjective      History of Present Illness:    Paula Valencia is a 62 y.o. female current smoker, with a history of HTN, HLD, T2DM, CAD s/p CABG in  at [facility]/stenting, and PVD s/p AA with runoff with left iliac PTA, right common iliac stent and left common iliac stent on 3/2/2022 with Dr. Vasques.  Patient was last seen in clinic for postoperative follow-up by EARLENE Calle on 2022, with complete relief of her claudication symptoms and resolution of bilateral thigh cramping.  At that time, she had reported worsening right calf cramping and left ankle cramping that occurred first thing in the morning and resolved throughout the day.  ABIs were performed which revealed decreased waveform amplitude and decreased ABIs bilaterally consistent with moderate peripheral vascular occlusive disease note blood pressure decreased in the left arm as compared to the right arm is suggestive of possible subclavian stenosis.  Plans were for 2-month follow-up.  Patient contacted our office for complaints of right leg pain and presents today for early evaluation. Patient reporting 2 weeks ago she started experiencing lower back pain that radiated to her right hip and right thigh that occurred with long periods of standing and certain lying positions that was resolved with rest/sitting.  This occurs randomly and is not exacerbated or alleviated by certain symptoms. She denies any return of any claudication type symptoms.  She has been compliant with ASA/Plavix therapy.  She does unfortunately continue to smoke.  She denies any new nonhealing  ulcerations.    Review of Systems:  Review of Systems   Constitutional: Negative. Negative for chills, decreased appetite, diaphoresis, fever, malaise/fatigue, night sweats and weight loss.   HENT: Positive for hoarse voice. Negative for congestion, sore throat and stridor.    Eyes: Positive for blurred vision.   Cardiovascular: Positive for claudication and dyspnea on exertion. Negative for chest pain, irregular heartbeat, leg swelling, near-syncope, orthopnea, palpitations, paroxysmal nocturnal dyspnea and syncope.   Respiratory: Negative.  Negative for cough, hemoptysis, shortness of breath, sleep disturbances due to breathing, snoring, sputum production and wheezing.    Hematologic/Lymphatic: Negative for adenopathy and bleeding problem. Bruises/bleeds easily.   Skin: Positive for color change and suspicious lesions. Negative for dry skin, itching, poor wound healing and rash.   Musculoskeletal: Positive for back pain. Negative for arthritis, falls and muscle weakness.   Gastrointestinal: Positive for abdominal pain. Negative for anorexia, constipation, diarrhea, hematochezia, melena, nausea and vomiting.   Neurological: Positive for loss of balance. Negative for difficulty with concentration, disturbances in coordination, dizziness, numbness, seizures, vertigo and weakness.   Psychiatric/Behavioral: Negative for altered mental status, depression, memory loss and substance abuse. The patient is nervous/anxious. The patient does not have insomnia.    Allergic/Immunologic: Negative.  Negative for persistent infections.       I have reviewed the following portions of the patient's history: allergies, current medications, past family history, past medical history, past social history, past surgical history, problem list and ROS and confirm it's accurate.    Allergies:  No Known Allergies    Medications:      Current Outpatient Medications:   •  ALPRAZolam (XANAX) 0.5 MG tablet, Take 0.5 mg by mouth 2 (Two) Times a  Day. for anxiety, Disp: , Rfl:   •  aspirin 81 MG EC tablet, Take 81 mg by mouth Daily., Disp: , Rfl:   •  atorvastatin (LIPITOR) 80 MG tablet, Take 80 mg by mouth Daily., Disp: , Rfl:   •  clopidogrel (PLAVIX) 75 MG tablet, Take 75 mg by mouth Daily. Coronary stent x 1, Disp: , Rfl:   •  cyclobenzaprine (FLEXERIL) 10 MG tablet, Take 10 mg by mouth 2 (Two) Times a Day As Needed for Muscle Spasms., Disp: , Rfl:   •  ferrous sulfate 325 (65 FE) MG tablet, Take 325 mg by mouth Daily With Breakfast., Disp: , Rfl:   •  gabapentin (NEURONTIN) 300 MG capsule, Take 300 mg by mouth 3 (Three) Times a Day., Disp: , Rfl:   •  insulin glargine (LANTUS, SEMGLEE) 100 UNIT/ML injection, Inject 15 Units under the skin into the appropriate area as directed Daily. For blood glucose greater than or equal to 400, Disp: , Rfl:   •  insulin NPH-insulin regular (humuLIN 70/30,novoLIN 70/30) (70-30) 100 UNIT/ML injection, Inject  under the skin into the appropriate area as directed As Needed., Disp: , Rfl:   •  isosorbide mononitrate (IMDUR) 120 MG 24 hr tablet, Take 120 mg by mouth Daily., Disp: , Rfl:   •  lisinopril (PRINIVIL,ZESTRIL) 20 MG tablet, Take 20 mg by mouth Daily., Disp: , Rfl:   •  metFORMIN (GLUCOPHAGE) 500 MG tablet, Take 1,000 mg by mouth 2 (Two) Times a Day With Meals., Disp: , Rfl:   •  metoprolol succinate XL (TOPROL-XL) 50 MG 24 hr tablet, Take 75 mg by mouth Daily., Disp: , Rfl:   •  nitroglycerin (NITROLINGUAL) 0.4 MG/SPRAY spray, Place 1 spray under the tongue As Needed., Disp: , Rfl:   •  ranolazine (RANEXA) 500 MG 12 hr tablet, Take 500 mg by mouth Daily., Disp: , Rfl:   •  VITAMIN D PO, Take  by mouth., Disp: , Rfl:     History:   Past Medical History:   Diagnosis Date   • Anxiety    • Arthritis    • Bilateral leg cramps    • Coronary artery disease    • Diabetes mellitus (HCC)    • GERD (gastroesophageal reflux disease)    • History of transfusion 1995    no reaction   • Hyperlipidemia    • Hypertension    •  Peripheral vascular disease (HCC)    • Renal cyst     bilateral   • Sleep apnea     c-pap not in use since 02/2021   • SOB (shortness of breath)    • Wears glasses        Past Surgical History:   Procedure Laterality Date   • AORTAGRAM N/A 6/16/2021    Procedure: ABDOMINAL AORTAGRAM;  Surgeon: Beau Vasques MD;  Location:  [facility];  Service: Vascular;  Laterality: N/A;  FLUORO - 9 MIN   DOISE- 62MGY  40 ML VISIPAQUE 320     • AORTAGRAM N/A 3/2/2022    Procedure: AORTAGRAM WITH RUNOFFS, LEFT COMMON ILIAC ANGIOPLASTY AND BILATERAL COMMON ILIAC STENT PLACEMENT;  Surgeon: Beau Vasques MD;  Location:  [facility];  Service: Vascular;  Laterality: N/A;  FLUORO: 13 MIN  DOSE: 288 MGY  CONTRAST: 100 ML VISI   • BACK SURGERY     • CARDIAC CATHETERIZATION     • CARDIAC CATHETERIZATION Left 8/5/2021    Procedure: Left Heart Cath;  Surgeon: Beau Frazier MD;  Location:  [facility];  Service: Cardiovascular;  Laterality: Left;   • CHOLECYSTECTOMY     • COLONOSCOPY  2019   • CORONARY ARTERY BYPASS GRAFT      2013 approx at [facility] x 3    • CORONARY STENT PLACEMENT      after CABG    • ENDOSCOPY     • HYSTERECTOMY     • INGUINAL HERNIA REPAIR Right    • LAPAROSCOPIC OVARIAN CYSTECTOMY         Social History     Socioeconomic History   • Marital status:    • Number of children: 2   Tobacco Use   • Smoking status: Current Some Day Smoker     Packs/day: 0.50     Years: 40.00     Pack years: 20.00     Types: Cigarettes     Start date: 1981   • Smokeless tobacco: Never Used   Vaping Use   • Vaping Use: Never used   Substance and Sexual Activity   • Alcohol use: Not Currently   • Drug use: Never   • Sexual activity: Defer        Family History   Problem Relation Age of Onset   • Heart attack Mother    • Hypertension Mother    • Emphysema Mother    • Other Father         history unknown   • No Known Problems Sister    • No Known Problems Brother    • No Known Problems Maternal  "Grandmother    • No Known Problems Maternal Grandfather    • No Known Problems Paternal Grandmother    • No Known Problems Paternal Grandfather        Objective     Physical Exam:  Vitals:    04/28/22 1039   BP: (!) 194/96   BP Location: Right arm   Patient Position: Sitting   Pulse: 65   Temp: 97.9 °F (36.6 °C)   SpO2: 97%   Weight: 58.5 kg (129 lb)   Height: 160 cm (63\")      Body mass index is 22.85 kg/m².    Physical Exam  Vitals and nursing note reviewed.   Constitutional:       Appearance: Normal appearance. She is well-developed.   HENT:      Head: Normocephalic and atraumatic.   Eyes:      Pupils: Pupils are equal, round, and reactive to light.   Neck:      Vascular: No carotid bruit.   Cardiovascular:      Rate and Rhythm: Normal rate and regular rhythm.      Pulses:           Dorsalis pedis pulses are detected w/ Doppler on the right side and detected w/ Doppler on the left side.        Posterior tibial pulses are detected w/ Doppler on the right side and detected w/ Doppler on the left side.      Heart sounds: Normal heart sounds, S1 normal and S2 normal. No murmur heard.     Comments: Easily dopplerable femoral pulses  Pulmonary:      Effort: Pulmonary effort is normal.      Breath sounds: Normal breath sounds.   Abdominal:      Palpations: Abdomen is soft.   Musculoskeletal:         General: No swelling.      Cervical back: Neck supple.      Right lower leg: No edema.      Left lower leg: No edema.   Skin:     General: Skin is warm and dry.      Capillary Refill: Capillary refill takes less than 2 seconds.      Findings: No bruising or wound.   Neurological:      General: No focal deficit present.      Mental Status: She is alert and oriented to person, place, and time. Mental status is at baseline.      GCS: GCS eye subscore is 4. GCS verbal subscore is 5. GCS motor subscore is 6.      Motor: Motor function is intact.      Coordination: Coordination is intact.      Gait: Gait is intact.   Psychiatric:  "        Mood and Affect: Mood normal.         Speech: Speech normal.         Behavior: Behavior normal. Behavior is cooperative.         Cognition and Memory: Cognition normal.         Imaging/Labs:    PVR/ABIs 4/4/22:   ABIs were performed which revealed decreased waveform amplitude and decreased ABIs bilaterally consistent with moderate peripheral vascular occlusive disease note blood pressure decreased in the left arm as compared to the right arm is suggestive of possible subclavian stenosis.          Assessment / Plan      Assessment / Plan:  Diagnoses and all orders for this visit:    1. PVD (peripheral vascular disease) (HCC) (Primary)       1. PVD s/p AA with runoff with left iliac PTA, right common iliac stent and left common iliac stent on 3/2/2022 with Dr. Vasques: Patient contacted our office for complaints of right leg pain and presents today for early evaluation. Patient reporting 2 weeks ago she started experiencing lower back pain that radiated to her right hip and right thigh that occurred with long periods of standing and certain lying positions that was resolved with rest/sitting.  This occurs randomly and is not exacerbated or alleviated by certain symptoms. She denies any return of any claudication type symptoms.  She has been compliant with ASA/Plavix therapy.  She does unfortunately continue to smoke.  She denies any new nonhealing ulcerations. Patient currently denying right calf cramping and left ankle cramping that occurred first thing in the morning and resolved throughout the day.  ABIs were performed at last visit which revealed decreased waveform amplitude and decreased ABIs bilaterally consistent with moderate peripheral vascular occlusive disease note blood pressure decreased in the left arm as compared to the right arm is suggestive of possible subclavian stenosis. Patient denies any BUE symptoms. Patient has been taking tylenol but has not been taking ibuprofen.  On physical exam  today, she has easily dopplerable bilateral femoral, DP/PT pulses and no evidence of acute limb ischemia.  Her pain is not consistent with claudication and is more consistent with a arthritic/neuropathic pain from previous back surgeries.  She does report she has had sciatica in the past.  I have encouraged her to begin alternating ibuprofen/acetaminophen and continue with heat/ice to right hip area and to make appointment with her PCP for evaluation for right hip pain as I do not think it is related to her recent iliac stenting at this time based on her physical exam and symptom description.  Will follow up with patient in 3 to 4 weeks with a telephone visit to reassess her symptoms after implementing ibuprofen and seeing her PCP.  If symptoms continue to progress, will order CTA A/P with runoff at that time for further evaluation of her vasculature.    Follow Up:   Return in about 4 weeks (around 5/26/2022).   Or sooner for any further concerns or worsening sign and symptoms. If unable to reach us in the office please dial 911 or go to the nearest emergency department.      Fifi HENAO  [facility] Cardiothoracic Surgery    Time Spent: I spent 33 minutes caring for Paula on this date of service. This time includes time spent by me in the following activities: preparing for the visit, reviewing tests, obtaining and/or reviewing a separately obtained history, performing a medically appropriate examination and/or evaluation, counseling and educating the patient/family/caregiver, ordering medications, tests, or procedures, documenting information in the medical record, independently interpreting results and communicating that information with the patient/family/caregiver and care coordination.

## 2022-05-26 ENCOUNTER — OFFICE VISIT (OUTPATIENT)
Dept: CARDIAC SURGERY | Facility: CLINIC | Age: 62
End: 2022-05-26

## 2022-05-26 DIAGNOSIS — I73.9 PVD (PERIPHERAL VASCULAR DISEASE): ICD-10-CM

## 2022-05-26 DIAGNOSIS — Z95.828 S/P INSERTION OF ILIAC ARTERY STENT: Primary | ICD-10-CM

## 2022-05-26 DIAGNOSIS — Z95.828 S/P INSERTION OF ILIAC ARTERY STENT: ICD-10-CM

## 2022-05-26 DIAGNOSIS — I70.211 ATHEROSCLEROSIS OF NATIVE ARTERIES OF EXTREMITIES WITH INTERMITTENT CLAUDICATION, RIGHT LEG: ICD-10-CM

## 2022-05-26 DIAGNOSIS — I73.9 PVD (PERIPHERAL VASCULAR DISEASE): Primary | ICD-10-CM

## 2022-05-26 PROCEDURE — 99442 PR PHYS/QHP TELEPHONE EVALUATION 11-20 MIN: CPT | Performed by: NURSE PRACTITIONER

## 2022-05-26 NOTE — PROGRESS NOTES
You have chosen to receive care through a telephone visit. Do you consent to use a telephone visit for your medical care today? Yes     Telehealth E-Visit      Patient Name: Paula Valencia  : 1960   MRN: [id number]     The patient has consented to a Telehealth Visit.     Chief Complaint:    Chief Complaint   Patient presents with   • Follow-up     4 WK FU for PVD       History of Present Illness:   Paula Valencia is a 62 y.o. female current smoker, with a history of HTN, HLD, T2DM, CAD s/p CABG in  at [facility]/stenting, and PVD s/p AA with runoff with left iliac PTA, right common iliac stent and left common iliac stent on 3/2/2022 with Dr. Vasques. Patient was seen in initial postoperative follow-up by EARLENE Calle on 2022, with complete relief of her claudication symptoms and resolution of bilateral thigh cramping.  At that time, she had reported worsening right calf cramping and left ankle cramping that occurred first thing in the morning and resolved throughout the day.  ABIs were performed which revealed decreased waveform amplitude and decreased ABIs bilaterally consistent with moderate peripheral vascular occlusive disease note blood pressure decreased in the left arm as compared to the right arm is suggestive of possible subclavian stenosis. Patient was last seen in clinic by me on 22 with complaints of 2-week history of right hip and right thigh pain occurring with long periods of standing and relieved with rest, not exacerbated or alleviated by certain symptoms.  At that time, patient had physical exam with easily dopplerable DP/PT pulses and no evidence of acute limb ischemia and symptoms more consistent with arthritic/neuropathic pain with her history of previous back surgeries.  Recommendations were to follow with her PCP for evaluation for possible sciatica.  I also advised beginning regimen of alternating ibuprofen/acetaminophen and heat/ice to right hip area.   Patient presents today for 4-week follow-up to reassess her symptoms.  Patient reports her right hip pain is worse since last visit, now severe in nature.  She reports it is almost constant with any movements.  Patient has not been seen by her PCP.  She denies any cramping pain but does report RLE fatigue like symptoms.  She does unfortunately continue to smoke.      Subjective      Review of Systems:   Review of Systems   Constitutional: Negative for chills, fever, malaise/fatigue, night sweats and weight loss.   HENT: Negative for hearing loss, odynophagia and sore throat.    Cardiovascular: Positive for chest pain (stress related), claudication (right hip and leg), dyspnea on exertion and leg swelling. Negative for orthopnea and palpitations.   Respiratory: Positive for cough. Negative for hemoptysis.    Endocrine: Negative for cold intolerance, heat intolerance, polydipsia, polyphagia and polyuria.   Hematologic/Lymphatic: Bruises/bleeds easily.   Skin: Negative for itching and rash.   Musculoskeletal: Positive for arthritis, back pain and joint pain. Negative for joint swelling and myalgias.   Gastrointestinal: Negative for abdominal pain, constipation, hematemesis, hematochezia, melena, nausea and vomiting.   Genitourinary: Negative for dysuria, frequency and hematuria.   Neurological: Positive for dizziness and light-headedness. Negative for focal weakness, headaches, numbness and seizures.   Psychiatric/Behavioral: Negative for suicidal ideas.   All other systems reviewed and are negative.      I have reviewed the following portions of the patient's history: allergies, current medications, past family history, past medical history, past social history, past surgical history, problem list and ROS and confirm it's accurate.    Medications:     Current Outpatient Medications:   •  ALPRAZolam (XANAX) 0.5 MG tablet, Take 0.5 mg by mouth 2 (Two) Times a Day. for anxiety, Disp: , Rfl:   •  aspirin 81 MG EC tablet,  Take 81 mg by mouth Daily., Disp: , Rfl:   •  atorvastatin (LIPITOR) 80 MG tablet, Take 80 mg by mouth Daily., Disp: , Rfl:   •  clopidogrel (PLAVIX) 75 MG tablet, Take 75 mg by mouth Daily. Coronary stent x 1, Disp: , Rfl:   •  cyclobenzaprine (FLEXERIL) 10 MG tablet, Take 10 mg by mouth 2 (Two) Times a Day As Needed for Muscle Spasms., Disp: , Rfl:   •  ferrous sulfate 325 (65 FE) MG tablet, Take 325 mg by mouth Daily With Breakfast., Disp: , Rfl:   •  gabapentin (NEURONTIN) 300 MG capsule, Take 300 mg by mouth 3 (Three) Times a Day., Disp: , Rfl:   •  insulin glargine (LANTUS, SEMGLEE) 100 UNIT/ML injection, Inject 15 Units under the skin into the appropriate area as directed Daily. For blood glucose greater than or equal to 400, Disp: , Rfl:   •  insulin NPH-insulin regular (humuLIN 70/30,novoLIN 70/30) (70-30) 100 UNIT/ML injection, Inject  under the skin into the appropriate area as directed As Needed., Disp: , Rfl:   •  isosorbide mononitrate (IMDUR) 120 MG 24 hr tablet, Take 120 mg by mouth Daily., Disp: , Rfl:   •  lisinopril (PRINIVIL,ZESTRIL) 20 MG tablet, Take 20 mg by mouth Daily., Disp: , Rfl:   •  metFORMIN (GLUCOPHAGE) 500 MG tablet, Take 1,000 mg by mouth 2 (Two) Times a Day With Meals., Disp: , Rfl:   •  metoprolol succinate XL (TOPROL-XL) 50 MG 24 hr tablet, Take 75 mg by mouth Daily., Disp: , Rfl:   •  nitroglycerin (NITROLINGUAL) 0.4 MG/SPRAY spray, Place 1 spray under the tongue As Needed., Disp: , Rfl:   •  ranolazine (RANEXA) 500 MG 12 hr tablet, Take 500 mg by mouth Daily., Disp: , Rfl:   •  VITAMIN D PO, Take  by mouth. (Patient not taking: Reported on 5/26/2022), Disp: , Rfl:     Allergies:   No Known Allergies    Objective     Physical Exam: ALFREDO via telehealth   Vital Signs: There were no vitals filed for this visit.  There is no height or weight on file to calculate BMI.    Physical Exam  Constitutional:       General: She is awake.   Neurological:      Mental Status: She is alert.    Psychiatric:         Behavior: Behavior is cooperative.         Imaging/Labs:    PVR/ABIs 4/4/22:   ABIs were performed which revealed decreased waveform amplitude and decreased ABIs bilaterally consistent with moderate peripheral vascular occlusive disease note blood pressure decreased in the left arm as compared to the right arm is suggestive of possible subclavian stenosis.    Assessment / Plan      Assessment/Plan:  Diagnoses and all orders for this visit:    1. PVD (peripheral vascular disease) (HCC) (Primary)    2. S/P insertion of iliac artery stent       1. PVD s/p AA with runoff with left iliac PTA, right common iliac stent and left common iliac stent on 3/2/2022 with Dr. Vasques:  Since last visit, patient reporting worsening right hip pain and right thigh pain that radiates down to her feet and BLE pain behind her knees.  This pain is now almost constant and severe in nature per patient report.  She reports that she had attempted to take ibuprofen/acetaminophen but did not have any relief of symptoms.  She has been compliant with ASA/Plavix therapy, but does unfortunately continue to smoke.  She denies any new BLE discoloration or ulcerations since last visit.  Patient has not been evaluated by her PCP and has appointment scheduled for 6/2/22.  Patient does have history of sciatica with previous back surgeries. At time of last exam, she did have easily dopplerable pulses. Due to recent right iliac stent placement and patient report of severe pain in this area, I am concerned for stent complication with patient's continued smoking status. Will obtain CTA AA with runoff for further evaluation and see patient back in clinic to discuss results.     Follow Up:   Return for F/u with imaging.  Or sooner for any further concerns or worsening sign and symptoms. If unable to reach us in the office please dial 911 or go to the nearest emergency department.    This visit has been rescheduled as a phone visit to  comply with patient safety concerns in accordance with CDC recommendations. Total time of discussion was 15 minutes.     EARLENE Azul  [facility] Cardiothoracic Surgery

## 2022-06-15 ENCOUNTER — HOSPITAL ENCOUNTER (OUTPATIENT)
Dept: CT IMAGING | Facility: HOSPITAL | Age: 62
Discharge: HOME | End: 2022-06-15
Admitting: NURSE PRACTITIONER

## 2022-06-15 DIAGNOSIS — Z95.828 S/P INSERTION OF ILIAC ARTERY STENT: ICD-10-CM

## 2022-06-15 DIAGNOSIS — I70.211 ATHEROSCLEROSIS OF NATIVE ARTERIES OF EXTREMITIES WITH INTERMITTENT CLAUDICATION, RIGHT LEG: ICD-10-CM

## 2022-06-15 LAB — CREAT BLDA-MCNC: 1.1 MG/DL (ref 0.6–1.3)

## 2022-06-15 PROCEDURE — 0 IOPAMIDOL PER 1 ML: Performed by: NURSE PRACTITIONER

## 2022-06-15 PROCEDURE — 82565 ASSAY OF CREATININE: CPT

## 2022-06-15 PROCEDURE — 75635 CT ANGIO ABDOMINAL ARTERIES: CPT

## 2022-06-15 RX ADMIN — IOPAMIDOL 123 ML: 755 INJECTION, SOLUTION INTRAVENOUS at 10:51

## 2022-06-23 ENCOUNTER — OFFICE VISIT (OUTPATIENT)
Dept: CARDIAC SURGERY | Facility: CLINIC | Age: 62
End: 2022-06-23

## 2022-06-23 VITALS
SYSTOLIC BLOOD PRESSURE: 158 MMHG | HEIGHT: 63 IN | WEIGHT: 131.4 LBS | TEMPERATURE: 98.1 F | HEART RATE: 99 BPM | BODY MASS INDEX: 23.28 KG/M2 | OXYGEN SATURATION: 99 % | DIASTOLIC BLOOD PRESSURE: 92 MMHG

## 2022-06-23 DIAGNOSIS — I73.9 PVD (PERIPHERAL VASCULAR DISEASE): Primary | ICD-10-CM

## 2022-06-23 PROCEDURE — 99214 OFFICE O/P EST MOD 30 MIN: CPT | Performed by: NURSE PRACTITIONER

## 2022-06-23 RX ORDER — METOPROLOL SUCCINATE 100 MG/1
TABLET, EXTENDED RELEASE ORAL
COMMUNITY
Start: 2022-06-22

## 2022-06-23 NOTE — PROGRESS NOTES
"     [facility] Cardiothoracic Surgery Office Follow Up Note     Date of Encounter: 2022     Name: Paula Valencia  : 1960     Referred By: No ref. provider found  PCP: Oswald Cedillo MD    Chief Complaint:    Chief Complaint   Patient presents with   • Follow-up     Follow up after CTA w/ r/o for PVD. Pt states that her right leg hurts all the time. \"I have pain from my right hip that runs all the way down the back of my leg into my right foot\". Denies any lower leg swelling.        Subjective      History of Present Illness:    Paula Valencia is a 62 y.o. female current smoker, with a history of HTN, HLD, T2DM, CAD s/p CABG in  at [facility], and PVD s/p AA with runoff with left iliac PTA, right common iliac stent and left common iliac stent on 3/2/2022 with Dr. Vasques. Patient was seen in initial postoperative follow-up by EARLENE Calle on 2022, with complete relief of her claudication symptoms and resolution of bilateral thigh cramping. Patient was last seen in clinic by me on 22 with complaints of 2-week history of right hip and right thigh pain occurring with long periods of standing and relieved with rest, not exacerbated or alleviated by certain symptoms.  At that time, patient had physical exam with easily dopplerable DP/PT pulses and no evidence of acute limb ischemia and symptoms more consistent with arthritic/neuropathic pain with her history of previous back surgeries. It was recommended that patient be seen by her PCP, evaluated for sciatica. She was not and continued to report worsening RLE pain. CTA AA with runoff was obtained and patient presents today to discuss results. Patient continues to report right calf pain and cramping/spasming that occurs randomly, not exacerbated or alleviated with rest but patient reports it is rather constant. It has prevented her from walking as much as she wants. She was prescribed some ibuprofen and muscle relaxer " that she takes at night that do alleviate some of the pain. She is now reporting that the pain started after a specific time, about a month ago, when she tried to hold the car door open with her foot.     Review of Systems:  Review of Systems   Constitutional: Positive for malaise/fatigue. Negative for chills, decreased appetite, diaphoresis, fever, night sweats, weight gain and weight loss.   HENT: Negative for hoarse voice.    Eyes: Negative for blurred vision, double vision and visual disturbance.   Cardiovascular: Positive for dyspnea on exertion (from time to time but can't walk very far ). Negative for chest pain, claudication, irregular heartbeat, leg swelling, near-syncope, orthopnea, palpitations, paroxysmal nocturnal dyspnea and syncope.   Respiratory: Negative for cough, hemoptysis, shortness of breath, sputum production and wheezing.    Hematologic/Lymphatic: Positive for bleeding problem. Negative for adenopathy. Bruises/bleeds easily.   Skin: Positive for poor wound healing. Negative for color change, nail changes and rash.   Musculoskeletal: Positive for back pain. Negative for falls and muscle cramps. Joint pain: right knee arthritis pain.   Gastrointestinal: Negative for abdominal pain, dysphagia and heartburn.   Genitourinary: Negative for flank pain.   Neurological: Positive for dizziness (when rising from sitting) and loss of balance. Negative for brief paralysis, disturbances in coordination, focal weakness, headaches, light-headedness, numbness, paresthesias, sensory change, vertigo and weakness.   Psychiatric/Behavioral: Negative for depression and suicidal ideas. The patient is nervous/anxious.    Allergic/Immunologic: Negative for persistent infections.       I have reviewed the following portions of the patient's history: allergies, current medications, past family history, past medical history, past social history, past surgical history, problem list and ROS and confirm it's  accurate.    Allergies:  No Known Allergies    Medications:      Current Outpatient Medications:   •  ALPRAZolam (XANAX) 0.5 MG tablet, Take 0.5 mg by mouth 2 (Two) Times a Day. for anxiety, Disp: , Rfl:   •  aspirin 81 MG EC tablet, Take 81 mg by mouth Daily., Disp: , Rfl:   •  atorvastatin (LIPITOR) 80 MG tablet, Take 80 mg by mouth Daily., Disp: , Rfl:   •  clopidogrel (PLAVIX) 75 MG tablet, Take 75 mg by mouth Daily. Coronary stent x 1, Disp: , Rfl:   •  cyclobenzaprine (FLEXERIL) 10 MG tablet, Take 10 mg by mouth 2 (Two) Times a Day As Needed for Muscle Spasms., Disp: , Rfl:   •  ferrous sulfate 325 (65 FE) MG tablet, Take 325 mg by mouth Daily With Breakfast., Disp: , Rfl:   •  gabapentin (NEURONTIN) 300 MG capsule, Take 300 mg by mouth 3 (Three) Times a Day., Disp: , Rfl:   •  insulin glargine (LANTUS, SEMGLEE) 100 UNIT/ML injection, Inject 15 Units under the skin into the appropriate area as directed Daily. For blood glucose greater than or equal to 400, Disp: , Rfl:   •  insulin NPH-insulin regular (humuLIN 70/30,novoLIN 70/30) (70-30) 100 UNIT/ML injection, Inject  under the skin into the appropriate area as directed As Needed., Disp: , Rfl:   •  isosorbide mononitrate (IMDUR) 120 MG 24 hr tablet, Take 120 mg by mouth Daily., Disp: , Rfl:   •  lisinopril (PRINIVIL,ZESTRIL) 20 MG tablet, Take 20 mg by mouth Daily., Disp: , Rfl:   •  metFORMIN (GLUCOPHAGE) 500 MG tablet, Take 1,000 mg by mouth 2 (Two) Times a Day With Meals., Disp: , Rfl:   •  metoprolol succinate XL (TOPROL-XL) 100 MG 24 hr tablet, , Disp: , Rfl:   •  metoprolol succinate XL (TOPROL-XL) 50 MG 24 hr tablet, Take 75 mg by mouth Daily., Disp: , Rfl:   •  nitroglycerin (NITROLINGUAL) 0.4 MG/SPRAY spray, Place 1 spray under the tongue As Needed., Disp: , Rfl:   •  ranolazine (RANEXA) 500 MG 12 hr tablet, Take 500 mg by mouth Daily., Disp: , Rfl:   •  VITAMIN D PO, Take  by mouth., Disp: , Rfl:     History:   Past Medical History:   Diagnosis  Date   • Anxiety    • Arthritis    • Bilateral leg cramps    • Coronary artery disease    • Diabetes mellitus (HCC)    • GERD (gastroesophageal reflux disease)    • History of transfusion 1995    no reaction   • Hyperlipidemia    • Hypertension    • Peripheral vascular disease (HCC)    • Renal cyst     bilateral   • Sleep apnea     c-pap not in use since 02/2021   • SOB (shortness of breath)    • Wears glasses        Past Surgical History:   Procedure Laterality Date   • AORTAGRAM N/A 6/16/2021    Procedure: ABDOMINAL AORTAGRAM;  Surgeon: Beau Vasques MD;  Location:  [facility];  Service: Vascular;  Laterality: N/A;  FLUORO - 9 MIN   DOISE- 62MGY  40 ML VISIPAQUE 320     • AORTAGRAM N/A 3/2/2022    Procedure: AORTAGRAM WITH RUNOFFS, LEFT COMMON ILIAC ANGIOPLASTY AND BILATERAL COMMON ILIAC STENT PLACEMENT;  Surgeon: Beau Vasques MD;  Location:  [facility];  Service: Vascular;  Laterality: N/A;  FLUORO: 13 MIN  DOSE: 288 MGY  CONTRAST: 100 ML VISI   • BACK SURGERY     • CARDIAC CATHETERIZATION     • CARDIAC CATHETERIZATION Left 8/5/2021    Procedure: Left Heart Cath;  Surgeon: Beau Frazier MD;  Location:  [facility]IVE LOCATION;  Service: Cardiovascular;  Laterality: Left;   • CHOLECYSTECTOMY     • COLONOSCOPY  2019   • CORONARY ARTERY BYPASS GRAFT      2013 approx at [facility] x 3    • CORONARY STENT PLACEMENT      after CABG    • ENDOSCOPY     • HYSTERECTOMY     • INGUINAL HERNIA REPAIR Right    • LAPAROSCOPIC OVARIAN CYSTECTOMY         Social History     Socioeconomic History   • Marital status:    • Number of children: 2   Tobacco Use   • Smoking status: Current Every Day Smoker     Packs/day: 0.50     Years: 40.00     Pack years: 20.00     Types: Cigarettes     Start date: 1981   • Smokeless tobacco: Never Used   • Tobacco comment: pt states that she is trying to quit   Vaping Use   • Vaping Use: Never used   Substance and Sexual Activity   • Alcohol use: Not Currently   •  "Drug use: Never   • Sexual activity: Defer        Family History   Problem Relation Age of Onset   • Heart attack Mother    • Hypertension Mother    • Emphysema Mother    • Other Father         history unknown   • No Known Problems Sister    • No Known Problems Brother    • No Known Problems Maternal Grandmother    • No Known Problems Maternal Grandfather    • No Known Problems Paternal Grandmother    • No Known Problems Paternal Grandfather        Objective     Physical Exam:  Vitals:    06/23/22 1439 06/23/22 1441   BP: 144/82 158/92   BP Location: Left arm Right arm   Pulse: 99    Temp: 98.1 °F (36.7 °C)    SpO2: 99%    Weight: 59.6 kg (131 lb 6.4 oz)    Height: 160 cm (63\")       Body mass index is 23.28 kg/m².    Physical Exam    Imaging/Labs:    CT Angio Abdominal Aorta Bilateral Iliofem Runoff    Result Date: 6/17/2022  Impression:  1. Severe focal stenosis of the infrarenal abdominal aorta as described above. 2. Status post kissing stent placement in both common iliac arteries. The stents are patent. 3. There is presence of diffuse disease in both iliac stations especially the external iliac arteries as described above. 4. There is mild to moderate stenosis of the right common femoral artery and ostial stenosis of the profunda femoris artery. The more distal arteries are patent. 5. There is presence of mild to moderate luminal compromise of the left common femoral artery with ostial stenosis of the left superficial femoral artery. There is segmental occlusion of the left posterior tibial artery in the distal one third of the calf with reconstitution more distally. 6. Relatively small size left kidney. This could be a reflection of the left renal artery stenosis. The left renal artery stenosis is discussed above. The clinical significance of the left Renal artery stenosis can be further evaluated with a renal artery Doppler interrogation. 7. Advanced degenerative disc disease at L5-S1 level.  This report was " finalized on 6/17/2022 8:33 AM by Pepe Jones MD.             Assessment / Plan      Assessment / Plan:  Diagnoses and all orders for this visit:    1. PVD (peripheral vascular disease) (HCC) (Primary)       1. PVD s/p AA with runoff with left iliac PTA, right common iliac stent and left common iliac stent on 3/2/2022 with Dr. Vasques: Patient initially had complete relief of her claudication symptoms and resolution of bilateral thigh cramping. Patient reporting now 4-6 week history of right hip and right thigh pain after a specific time/incident when she tried to hold the car door open with her foot. Patient continues to have physical exam with easily dopplerable DP/PT pulses and no evidence of acute limb ischemia and symptoms more consistent with arthritic/neuropathic pain with her history of previous back surgeries. It has been recommended that patient be seen by her PCP, evaluated for sciatica due to her history of sciatica and back surgery. Discussed the results of of patient's CTA with runoff revealing patent kissing bilateral iliac stents, severe focal stenosis of the infrarenal abdominal aorta above the stents, mild to moderate stenosis of the right common femoral artery, mild to moderate luminal compromise of the left common femoral artery with ostial stenosis of the left SFA, segmental occlusion of the left posterior tibial artery in the distal one third of the calf with reconstitution more distally.  Compared to previous CTA with runoff imaging, aortic stenosis was present previously.  Patient's symptoms are not consistent with this aortic stenosis as they are not bilateral, patient continues to have symptoms consistent with sciatica/nerve pain from her more recent back injury.  Will review case and imaging with Dr. Vasques for recommendations, but I have discussed the importance of following up with her PCP or previous back surgeon for evaluation of her lower back pain with radiculopathy pain down her  right leg.      Follow Up:   No follow-ups on file.   Or sooner for any further concerns or worsening sign and symptoms. If unable to reach us in the office please dial 911 or go to the nearest emergency department.      Fifi HENAO  [facility] Cardiothoracic Surgery    Time Spent: I spent 37 minutes caring for Paula on this date of service. This time includes time spent by me in the following activities: preparing for the visit, reviewing tests, obtaining and/or reviewing a separately obtained history, performing a medically appropriate examination and/or evaluation, counseling and educating the patient/family/caregiver, documenting information in the medical record, independently interpreting results and communicating that information with the patient/family/caregiver and care coordination.

## (undated) DEVICE — NDL PERC 1PRT THNWALL W/BASEPLT 18G 7CM

## (undated) DEVICE — BALN EVERCROSS OTW .035 5F 4X20MM 80CM

## (undated) DEVICE — INFLATION DEVICE: Brand: ENCORE™ 26

## (undated) DEVICE — SI AVANTI+ 6F STD W/GW  NO OBT: Brand: AVANTI

## (undated) DEVICE — RADIFOCUS TORQUE DEVICE MULTI-TORQUE VISE: Brand: RADIFOCUS TORQUE DEVICE

## (undated) DEVICE — GLIDESHEATH SLENDER STAINLESS STEEL KIT: Brand: GLIDESHEATH SLENDER

## (undated) DEVICE — Device: Brand: OMNIWIRE PRESSURE GUIDE WIRE

## (undated) DEVICE — GLIDEX™ COATED HYDROPHILIC GUIDEWIRE: Brand: MAGIC TORQUE™

## (undated) DEVICE — XIENCE SIERRA™ EVEROLIMUS ELUTING CORONARY STENT SYSTEM 2.50 MM X 12 MM / RAPID-EXCHANGE
Type: IMPLANTABLE DEVICE | Status: NON-FUNCTIONAL
Brand: XIENCE SIERRA™

## (undated) DEVICE — PK CATH CARD 10

## (undated) DEVICE — TBG INJ CONTRL EXCITE RA 1200PSI 48IN

## (undated) DEVICE — MINI TREK CORONARY DILATATION CATHETER 1.50 MM X 12 MM / RAPID-EXCHANGE: Brand: MINI TREK

## (undated) DEVICE — HI-TORQUE WHISPER MS GUIDE WIRE .014 STRAIGHT TIP 3.0 CM X 190 CM: Brand: HI-TORQUE WHISPER

## (undated) DEVICE — AVANTI + 4F STD W/GW: Brand: AVANTI

## (undated) DEVICE — RADIFOCUS GLIDEWIRE ADVANTAGE GUIDEWIRE: Brand: GLIDEWIRE ADVANTAGE

## (undated) DEVICE — CATH MPAC INF F4 JR4/JL4/PIG: Brand: INFINITI

## (undated) DEVICE — GW THRUWAY .018 190CM

## (undated) DEVICE — DEV COMP RAD PRELUDESYNC 24CM

## (undated) DEVICE — SNAP KOVER: Brand: UNBRANDED

## (undated) DEVICE — Device

## (undated) DEVICE — RADIFOCUS GLIDECATH: Brand: GLIDECATH

## (undated) DEVICE — TREK CORONARY DILATATION CATHETER 2.50 MM X 15 MM / RAPID-EXCHANGE: Brand: TREK

## (undated) DEVICE — PK ANGIO OR 10

## (undated) DEVICE — GUIDELINER CATHETERS ARE INTENDED TO BE USED IN CONJUNCTION WITH GUIDE CATHETERS TO ACCESS DISCRETE REGIONS OF THE CORONARY AND/OR PERIPHERAL VASCULATURE, AND TO FACILITATE PLACEMENT OF INTERVENTIONAL DEVICES.: Brand: GUIDELINER® V3 CATHETER

## (undated) DEVICE — CATH DIAG EXPO M/ PK 6FR FL4/FR4 PIG 3PK

## (undated) DEVICE — BALN NC/EUPHORA RX 2.50X12MM

## (undated) DEVICE — ADHS SKIN PREMIERPRO EXOFIN TOPICAL HI/VISC .5ML

## (undated) DEVICE — GLIDESHEATH BASIC HYDROPHILIC COATED INTRODUCER SHEATH: Brand: GLIDESHEATH

## (undated) DEVICE — CATH GUIDE SOFTVU FLUSH HT PIG .035 4F 65CM

## (undated) DEVICE — RADIFOCUS GLIDEWIRE: Brand: GLIDEWIRE

## (undated) DEVICE — CATH DIAG IMPULSE IMT 6F 100CM

## (undated) DEVICE — MINI TREK CORONARY DILATATION CATHETER 2.0 MM X 15 MM / RAPID-EXCHANGE: Brand: MINI TREK

## (undated) DEVICE — GUIDE CATHETER: Brand: MACH1™

## (undated) DEVICE — GLV SURG PREMIERPRO MIC LTX PF SZ7 BRN

## (undated) DEVICE — CVR TRANSD FLX 3DIMEN 14X29.2CM LF STRL

## (undated) DEVICE — GW MAGICTORQUE .035 260CM

## (undated) DEVICE — GW LUGE .014 182 CM

## (undated) DEVICE — MODEL BT2000 P/N 700287-012KIT CONTENTS: MANIFOLD WITH SALINE AND CONTRAST PORTS, SALINE TUBING WITH SPIKE AND HAND SYRINGE, TRANSDUCER: Brand: BT2000 AUTOMATED MANIFOLD KIT

## (undated) DEVICE — TREK CORONARY DILATATION CATHETER 2.50 MM X 12 MM / RAPID-EXCHANGE: Brand: TREK

## (undated) DEVICE — MODEL AT P65, P/N 701554-001KIT CONTENTS: HAND CONTROLLER, 3-WAY HIGH-PRESSURE STOPCOCK WITH ROTATING END AND PREMIUM HIGH-PRESSURE TUBING: Brand: ANGIOTOUCH® KIT

## (undated) DEVICE — TREK CORONARY DILATATION CATHETER 2.50 MM X 30 MM / RAPID-EXCHANGE: Brand: TREK

## (undated) DEVICE — CATH DIAG EXPO .056 FL3.5 6F 100CM

## (undated) DEVICE — DEV INFL MONARCH 25W